# Patient Record
Sex: FEMALE | Race: WHITE | NOT HISPANIC OR LATINO | Employment: FULL TIME | URBAN - METROPOLITAN AREA
[De-identification: names, ages, dates, MRNs, and addresses within clinical notes are randomized per-mention and may not be internally consistent; named-entity substitution may affect disease eponyms.]

---

## 2018-12-13 ENCOUNTER — OFFICE VISIT (OUTPATIENT)
Dept: FAMILY MEDICINE CLINIC | Facility: CLINIC | Age: 38
End: 2018-12-13

## 2018-12-13 VITALS
HEIGHT: 63 IN | SYSTOLIC BLOOD PRESSURE: 118 MMHG | WEIGHT: 155 LBS | RESPIRATION RATE: 13 BRPM | BODY MASS INDEX: 27.46 KG/M2 | DIASTOLIC BLOOD PRESSURE: 86 MMHG | OXYGEN SATURATION: 99 % | HEART RATE: 75 BPM

## 2018-12-13 DIAGNOSIS — R53.82 CHRONIC FATIGUE: ICD-10-CM

## 2018-12-13 DIAGNOSIS — G43.109 MIGRAINE WITH AURA AND WITHOUT STATUS MIGRAINOSUS, NOT INTRACTABLE: ICD-10-CM

## 2018-12-13 DIAGNOSIS — Z13.220 SCREENING FOR HYPERLIPIDEMIA: ICD-10-CM

## 2018-12-13 DIAGNOSIS — Z23 NEED FOR VACCINATION: ICD-10-CM

## 2018-12-13 DIAGNOSIS — F32.A ANXIETY AND DEPRESSION: Primary | ICD-10-CM

## 2018-12-13 DIAGNOSIS — Z13.1 SCREENING FOR DIABETES MELLITUS: ICD-10-CM

## 2018-12-13 DIAGNOSIS — F41.9 ANXIETY AND DEPRESSION: Primary | ICD-10-CM

## 2018-12-13 DIAGNOSIS — R47.1 DYSARTHRIA: ICD-10-CM

## 2018-12-13 LAB
ALBUMIN SERPL-MCNC: 4.3 G/DL (ref 3.5–5.2)
ALBUMIN/GLOB SERPL: 1.6 G/DL
ALP SERPL-CCNC: 62 U/L (ref 39–117)
ALT SERPL-CCNC: 14 U/L (ref 1–33)
AST SERPL-CCNC: 17 U/L (ref 1–32)
BILIRUB SERPL-MCNC: 0.4 MG/DL (ref 0.1–1.2)
BUN SERPL-MCNC: 10 MG/DL (ref 6–20)
BUN/CREAT SERPL: 11.9 (ref 7–25)
CALCIUM SERPL-MCNC: 9.8 MG/DL (ref 8.6–10.5)
CHLORIDE SERPL-SCNC: 102 MMOL/L (ref 98–107)
CHOLEST SERPL-MCNC: 228 MG/DL (ref 0–200)
CO2 SERPL-SCNC: 25 MMOL/L (ref 22–29)
CREAT SERPL-MCNC: 0.84 MG/DL (ref 0.57–1)
ERYTHROCYTE [DISTWIDTH] IN BLOOD BY AUTOMATED COUNT: 13.8 % (ref 11.7–13)
GLOBULIN SER CALC-MCNC: 2.7 GM/DL
GLUCOSE SERPL-MCNC: 91 MG/DL (ref 65–99)
HBA1C MFR BLD: 5.3 % (ref 4.8–5.6)
HCT VFR BLD AUTO: 42.2 % (ref 35.6–45.5)
HDLC SERPL-MCNC: 62 MG/DL (ref 40–60)
HGB BLD-MCNC: 13.3 G/DL (ref 11.9–15.5)
LDLC SERPL CALC-MCNC: 145 MG/DL (ref 0–100)
MCH RBC QN AUTO: 28.3 PG (ref 26.9–32)
MCHC RBC AUTO-ENTMCNC: 31.5 G/DL (ref 32.4–36.3)
MCV RBC AUTO: 89.8 FL (ref 80.5–98.2)
PLATELET # BLD AUTO: 274 10*3/MM3 (ref 140–500)
POTASSIUM SERPL-SCNC: 4.4 MMOL/L (ref 3.5–5.2)
PROT SERPL-MCNC: 7 G/DL (ref 6–8.5)
RBC # BLD AUTO: 4.7 10*6/MM3 (ref 3.9–5.2)
SODIUM SERPL-SCNC: 140 MMOL/L (ref 136–145)
TRIGL SERPL-MCNC: 106 MG/DL (ref 0–150)
TSH SERPL DL<=0.005 MIU/L-ACNC: 2.86 MIU/ML (ref 0.27–4.2)
VLDLC SERPL CALC-MCNC: 21.2 MG/DL (ref 5–40)
WBC # BLD AUTO: 8.15 10*3/MM3 (ref 4.5–10.7)

## 2018-12-13 PROCEDURE — 99204 OFFICE O/P NEW MOD 45 MIN: CPT | Performed by: FAMILY MEDICINE

## 2018-12-13 PROCEDURE — 90674 CCIIV4 VAC NO PRSV 0.5 ML IM: CPT | Performed by: FAMILY MEDICINE

## 2018-12-13 PROCEDURE — 90471 IMMUNIZATION ADMIN: CPT | Performed by: FAMILY MEDICINE

## 2018-12-13 RX ORDER — VILAZODONE HYDROCHLORIDE 40 MG/1
40 TABLET ORAL DAILY
Qty: 90 TABLET | Refills: 1 | Status: SHIPPED | OUTPATIENT
Start: 2018-12-13 | End: 2019-09-18 | Stop reason: SDUPTHER

## 2018-12-13 RX ORDER — TRAZODONE HYDROCHLORIDE 100 MG/1
100 TABLET ORAL
Refills: 0 | COMMUNITY
Start: 2018-11-15 | End: 2019-09-18 | Stop reason: SDUPTHER

## 2018-12-13 RX ORDER — VILAZODONE HYDROCHLORIDE 40 MG/1
TABLET ORAL
Refills: 0 | COMMUNITY
Start: 2018-10-25 | End: 2018-12-13 | Stop reason: SDUPTHER

## 2018-12-13 RX ORDER — LORAZEPAM 1 MG/1
1 TABLET ORAL EVERY 8 HOURS PRN
COMMUNITY
End: 2018-12-16 | Stop reason: ALTCHOICE

## 2018-12-13 RX ORDER — ALBUTEROL SULFATE 90 UG/1
2 AEROSOL, METERED RESPIRATORY (INHALATION) EVERY 4 HOURS PRN
COMMUNITY

## 2018-12-13 RX ORDER — PROPRANOLOL HYDROCHLORIDE 20 MG/1
20 TABLET ORAL 3 TIMES DAILY PRN
Qty: 90 TABLET | Refills: 1 | Status: SHIPPED | OUTPATIENT
Start: 2018-12-13 | End: 2019-01-11 | Stop reason: SDUPTHER

## 2018-12-13 NOTE — PROGRESS NOTES
Subjective   Charis Herbert is a 38 y.o. female.     Chief Complaint   Patient presents with   • Anxiety   • Establish Care       HPI     Recently moved back to [place] from [place]    Anxiety and Depression:  -chronic condition  -no longer able to afford to follow up with her Psychiatrist  -but she does continue to follow up with a therapist monthly  -currently taking viibryd 40 mg daily and trazodone 75 mg QHS  -previously she took lorazepam 1 mg TID prn anxiety and panic attacks as well, but she ran out of this medication a couple of months ago  -she would be open to a trial of a different prn medication for panic attacks  -never hospitalized for mental health concerns  -NO suicidal thoughts or hx of self harm       She would like to get a flu shot today    ROS notable for intermittently garbled speech over the last month.  Episodes usually last a few minutes and consist of word finding difficulty as well as difficulty pronouncing words properly.  No associated numbness, weakness, or abnormal movements.  Last episode was about 1 month ago.  She does have a hx of migraines with visual aura, but these episodes are not concurrent with headaches.      Review of Systems   Constitutional: Positive for fatigue. Negative for fever.   HENT: Negative for congestion and sore throat.    Eyes: Negative for photophobia and pain.   Respiratory: Negative for cough and shortness of breath.    Cardiovascular: Negative for chest pain and palpitations.   Gastrointestinal: Negative for abdominal pain and nausea.   Musculoskeletal: Positive for arthralgias and back pain.   Neurological: Positive for speech difficulty and headaches. Negative for dizziness, weakness and numbness.   Psychiatric/Behavioral: Positive for sleep disturbance. Negative for self-injury and suicidal ideas. The patient is nervous/anxious.        The following portions of the patient's history were reviewed and updated as appropriate: allergies, current  medications, past family history, past medical history, past social history, past surgical history and problem list.    Past Medical History:   Diagnosis Date   • Allergic    • Anxiety    • Depression    • Hyperlipidemia    • Migraine with aura    • Sciatica      Past Surgical History:   Procedure Laterality Date   • FACIAL RECONSTRUCTION SURGERY      due to infection after root canal     Family History   Problem Relation Age of Onset   • Breast cancer Mother    • Depression Mother    • Miscarriages / Stillbirths Sister    • Alcohol abuse Brother    • Drug abuse Brother    • Breast cancer Maternal Aunt    • Breast cancer Maternal Grandmother    • Stroke Maternal Grandmother    • Alcohol abuse Maternal Grandmother    • Alcohol abuse Maternal Grandfather      Social History     Tobacco Use   • Smoking status: Current Some Day Smoker   • Smokeless tobacco: Never Used   Substance and Sexual Activity   • Alcohol use: Yes     Comment: intermittently   • Drug use: No   • Sexual activity: Yes                 Social History Narrative    She lives at home with her cat.  She works in childcare and education with toddlers.          Allergies   Allergen Reactions   • Dilaudid [Hydromorphone] Rash        Outpatient Medications Prior to Visit   Medication Sig Dispense Refill   • albuterol 108 (90 Base) MCG/ACT inhaler Inhale 2 puffs Every 4 (Four) Hours As Needed.            • traZODone (DESYREL) 100 MG tablet Take 75 mg by mouth every night at bedtime.  0   • VIIBRYD 40 MG tablet tablet TAKE 1 TABLET BY MOUTH EVERY MORNING WITH FOOD  0         Objective     Vitals:    12/13/18 0922   BP: 118/86   Pulse: 75   Resp: 13   SpO2: 99%       Physical Exam   Constitutional: She is oriented to person, place, and time. She appears well-developed and well-nourished. No distress.   HENT:   Head: Normocephalic and atraumatic.   Eyes: Conjunctivae and EOM are normal. Pupils are equal, round, and reactive to light.   Neck: No thyromegaly  present.   Cardiovascular: Normal rate, regular rhythm and normal heart sounds. Exam reveals no gallop and no friction rub.   No murmur heard.  Pulmonary/Chest: Effort normal and breath sounds normal. No respiratory distress. She has no wheezes. She has no rhonchi. She has no rales.   Abdominal: Soft. Bowel sounds are normal. She exhibits no distension. There is no tenderness.   Lymphadenopathy:     She has no cervical adenopathy.   Neurological: She is alert and oriented to person, place, and time. She has normal strength. No cranial nerve deficit. Gait normal.   Skin: Skin is warm and dry.   Psychiatric: Her speech is normal. Her mood appears anxious.       ASSESSMENT/PLAN       Problem List Items Addressed This Visit        Cardiovascular and Mediastinum    New onset episodic Dysarthria in a pt with a hx of Migraine ddx is broad and encompasses new migraine manifestation, mass lesion, TIA or stroke, demyelinating disorder, and medication side effects.  Pt is asymptomatic today and last experienced symptoms 1 month ago.      Other Relevant Orders   CT Head With Contrast       Trial of propranolol 20 mg TID prn anxiety may also reduce migraine frequency        Pt advised to go to ER if dysarthria symptoms return prior to follow up appt              Other Visit Diagnoses     Anxiety and depression    -  Primary    Refill Medications    VIIBRYD 40 MG tablet tablet  Continue Trazodone      Start propranolol (INDERAL) 20 MG tablet TID prn panic attack/severe anxiety    Other Relevant Orders    TSH (Completed)    Need for vaccination        Relevant Orders    Flucelvax Quad=>4Years (6950-3559) (Completed)    Screening for diabetes mellitus        Relevant Orders    Comprehensive metabolic panel (Completed)    Hemoglobin A1c (Completed)    Screening for hyperlipidemia        Relevant Orders    Lipid panel (Completed)    Chronic fatigue        Relevant Orders    Comprehensive metabolic panel (Completed)    Hemoglobin A1c  (Completed)    TSH (Completed)    CBC No Differential (Completed)       Patient Instructions   Migraine Headache  A migraine headache is an intense, throbbing pain on one side or both sides of the head. Migraines may also cause other symptoms, such as nausea, vomiting, and sensitivity to light and noise.  What are the causes?  Doing or taking certain things may also trigger migraines, such as:  · Alcohol.  · Smoking.  · Medicines, such as:  ? Medicine used to treat chest pain (nitroglycerine).  ? Birth control pills.  ? Estrogen pills.  ? Certain blood pressure medicines.  · Aged cheeses, chocolate, or caffeine.  · Foods or drinks that contain nitrates, glutamate, aspartame, or tyramine.  · Physical activity.    Other things that may trigger a migraine include:  · Menstruation.  · Pregnancy.  · Hunger.  · Stress, lack of sleep, too much sleep, or fatigue.  · Weather changes.    What increases the risk?  The following factors may make you more likely to experience migraine headaches:  · Age. Risk increases with age.  · Family history of migraine headaches.  · Being .  · Depression and anxiety.  · Obesity.  · Being a woman.  · Having a hole in the heart (patent foramen ovale) or other heart problems.    What are the signs or symptoms?  The main symptom of this condition is pulsating or throbbing pain. Pain may:  · Happen in any area of the head, such as on one side or both sides.  · Interfere with daily activities.  · Get worse with physical activity.  · Get worse with exposure to bright lights or loud noises.    Other symptoms may include:  · Nausea.  · Vomiting.  · Dizziness.  · General sensitivity to bright lights, loud noises, or smells.    Before you get a migraine, you may get warning signs that a migraine is developing (aura). An aura may include:  · Seeing flashing lights or having blind spots.  · Seeing bright spots, halos, or zigzag lines.  · Having tunnel vision or blurred vision.  · Having  numbness or a tingling feeling.  · Having trouble talking.  · Having muscle weakness.    How is this diagnosed?  A migraine headache can be diagnosed based on:  · Your symptoms.  · A physical exam.  · Tests, such as CT scan or MRI of the head. These imaging tests can help rule out other causes of headaches.  · Taking fluid from the spine (lumbar puncture) and analyzing it (cerebrospinal fluid analysis, or CSF analysis).    How is this treated?  A migraine headache is usually treated with medicines that:  · Relieve pain.  · Relieve nausea.  · Prevent migraines from coming back.    Treatment may also include:  · Acupuncture.  · Lifestyle changes like avoiding foods that trigger migraines.    Follow these instructions at home:  Medicines  · Take over-the-counter and prescription medicines only as told by your health care provider.  · Do not drive or use heavy machinery while taking prescription pain medicine.  · To prevent or treat constipation while you are taking prescription pain medicine, your health care provider may recommend that you:  ? Drink enough fluid to keep your urine clear or pale yellow.  ? Take over-the-counter or prescription medicines.  ? Eat foods that are high in fiber, such as fresh fruits and vegetables, whole grains, and beans.  ? Limit foods that are high in fat and processed sugars, such as fried and sweet foods.  Lifestyle  · Avoid alcohol use.  · Do not use any products that contain nicotine or tobacco, such as cigarettes and e-cigarettes. If you need help quitting, ask your health care provider.  · Get at least 8 hours of sleep every night.  · Limit your stress.  General instructions    · Keep a journal to find out what may trigger your migraine headaches. For example, write down:  ? What you eat and drink.  ? How much sleep you get.  ? Any change to your diet or medicines.  · If you have a migraine:  ? Avoid things that make your symptoms worse, such as bright lights.  ? It may help to lie  down in a dark, quiet room.  ? Do not drive or use heavy machinery.  ? Ask your health care provider what activities are safe for you while you are experiencing symptoms.  · Keep all follow-up visits as told by your health care provider. This is important.  Contact a health care provider if:  · You develop symptoms that are different or more severe than your usual migraine symptoms.  Get help right away if:  · Your migraine becomes severe.  · You have a fever.  · You have a stiff neck.  · You have vision loss.  · Your muscles feel weak or like you cannot control them.  · You start to lose your balance often.  · You develop trouble walking.  · You faint.  This information is not intended to replace advice given to you by your health care provider. Make sure you discuss any questions you have with your health care provider.  Document Released: 12/18/2006 Document Revised: 07/07/2017 Document Reviewed: 06/05/2017  [employer] Interactive Patient Education © 2017 [employer]      Return in about 1 month (around 1/13/2019) for Recheck mood and migraines.      Aishwarya Puckett MD  12/13/18

## 2018-12-18 ENCOUNTER — TELEPHONE (OUTPATIENT)
Dept: FAMILY MEDICINE CLINIC | Facility: CLINIC | Age: 38
End: 2018-12-18

## 2018-12-18 NOTE — TELEPHONE ENCOUNTER
Left VM to let patient know lab results and advised diet & exercise.     ----- Message from Aishwarya Puckett MD sent at 12/15/2018 11:14 AM EST -----  Please contact pt and let her know that her cholesterol is a little high, but not to the degree that she needs medication at this time.   Exercising regularly and eating a healthy diet rich in fresh produce, fiber, and lean protein but low in added sugar and fat can help manage this problem and reduce the risk for heart disease in the future.

## 2019-01-11 DIAGNOSIS — F41.9 ANXIETY AND DEPRESSION: ICD-10-CM

## 2019-01-11 DIAGNOSIS — F32.A ANXIETY AND DEPRESSION: ICD-10-CM

## 2019-01-11 RX ORDER — PROPRANOLOL HYDROCHLORIDE 20 MG/1
20 TABLET ORAL 3 TIMES DAILY PRN
Qty: 270 TABLET | Refills: 0 | Status: SHIPPED | OUTPATIENT
Start: 2019-01-11 | End: 2019-04-16 | Stop reason: SDUPTHER

## 2019-01-23 ENCOUNTER — OFFICE VISIT (OUTPATIENT)
Dept: FAMILY MEDICINE CLINIC | Facility: CLINIC | Age: 39
End: 2019-01-23

## 2019-01-23 VITALS
BODY MASS INDEX: 26.4 KG/M2 | DIASTOLIC BLOOD PRESSURE: 86 MMHG | HEART RATE: 78 BPM | OXYGEN SATURATION: 98 % | HEIGHT: 63 IN | WEIGHT: 149 LBS | SYSTOLIC BLOOD PRESSURE: 122 MMHG | RESPIRATION RATE: 13 BRPM

## 2019-01-23 DIAGNOSIS — F32.A DEPRESSION, UNSPECIFIED DEPRESSION TYPE: ICD-10-CM

## 2019-01-23 DIAGNOSIS — J40 BRONCHITIS: Primary | ICD-10-CM

## 2019-01-23 DIAGNOSIS — R06.83 SNORING: ICD-10-CM

## 2019-01-23 DIAGNOSIS — R53.82 CHRONIC FATIGUE: ICD-10-CM

## 2019-01-23 DIAGNOSIS — G43.809 OTHER MIGRAINE WITHOUT STATUS MIGRAINOSUS, NOT INTRACTABLE: ICD-10-CM

## 2019-01-23 PROCEDURE — 99214 OFFICE O/P EST MOD 30 MIN: CPT | Performed by: FAMILY MEDICINE

## 2019-01-23 RX ORDER — BENZONATATE 200 MG/1
200 CAPSULE ORAL 3 TIMES DAILY PRN
Qty: 30 CAPSULE | Refills: 0 | Status: SHIPPED | OUTPATIENT
Start: 2019-01-23 | End: 2019-02-02

## 2019-01-23 NOTE — PATIENT INSTRUCTIONS
Honey in hot tea can help soothe a sore throat.   AlternateTylenol and Ibuprofen every 4-6 hours as needed for pain and fever.  Get plenty of rest and fluids      Acute Bronchitis, Adult  Acute bronchitis is when air tubes (bronchi) in the lungs suddenly get swollen. The condition can make it hard to breathe. It can also cause these symptoms:  · A cough.  · Coughing up clear, yellow, or green mucus.  · Wheezing.  · Chest congestion.  · Shortness of breath.  · A fever.  · Body aches.  · Chills.  · A sore throat.    Follow these instructions at home:  Medicines  · Take over-the-counter and prescription medicines only as told by your doctor.  · If you were prescribed an antibiotic medicine, take it as told by your doctor. Do not stop taking the antibiotic even if you start to feel better.  General instructions  · Rest.  · Drink enough fluids to keep your pee (urine) clear or pale yellow.  · Avoid smoking and secondhand smoke. If you smoke and you need help quitting, ask your doctor. Quitting will help your lungs heal faster.  · Use an inhaler, cool mist vaporizer, or humidifier as told by your doctor.  · Keep all follow-up visits as told by your doctor. This is important.  How is this prevented?  To lower your risk of getting this condition again:  · Wash your hands often with soap and water. If you cannot use soap and water, use hand .  · Avoid contact with people who have cold symptoms.  · Try not to touch your hands to your mouth, nose, or eyes.  · Make sure to get the flu shot every year.    Contact a doctor if:  · Your symptoms do not get better in 2 weeks.  Get help right away if:  · You cough up blood.  · You have chest pain.  · You have very bad shortness of breath.  · You become dehydrated.  · You faint (pass out) or keep feeling like you are going to pass out.  · You keep throwing up (vomiting).  · You have a very bad headache.  · Your fever or chills gets worse.  This information is not intended to  replace advice given to you by your health care provider. Make sure you discuss any questions you have with your health care provider.  Document Released: 06/05/2009 Document Revised: 07/26/2017 Document Reviewed: 06/07/2017  [employer] Interactive Patient Education © 2018 [employer]

## 2019-01-23 NOTE — PROGRESS NOTES
Subjective   Charis Herbert is a 38 y.o. female.     Chief Complaint   Patient presents with   • Anxiety     Follow Up   • Migraine       HPI     Anxiety & Depression:  -she continues taking viibryd 40 mg daily and trazodone 75 mg QHS  -no panic attacks since last office visit  -she tried a dose of propranolol and tolerated it well with no adverse effects, so she plans to take it next time she has a panic attack  -mood stable and good over all  -no SI, HI, or self harm  -she is in a new relationship and things are going well  -she quit smoking 2 weeks ago along with new girlfriend     Cough:  -started about 2 weeks ago  -treated with antibiotics, steroid, cough syrup, and an inahler for bronchitis diagnosed in urgent care  -gradually improving    Migraines:  -hasn't had any migraines or even headaches since last visit  -no further episodes of garbled speech, so she canceled the CT scan of her head    ROS also notable for chronic fatigue.  She wakes up groggy every morning.  She does snore.  No PND but she does wake up unexpectedly 2-3 x per night.         Review of Systems   Constitutional: Positive for fatigue. Negative for fever.   HENT: Negative for congestion, ear pain and sore throat.    Respiratory: Positive for cough and wheezing. Negative for shortness of breath.    Cardiovascular: Negative for chest pain and palpitations.   Gastrointestinal: Negative for abdominal pain, diarrhea, nausea and vomiting.   Genitourinary: Negative for dysuria.   Musculoskeletal: Negative for arthralgias and myalgias.   Skin: Negative for rash.   Neurological: Negative for dizziness and headaches.   Psychiatric/Behavioral: Negative for dysphoric mood, self-injury and suicidal ideas. The patient is not nervous/anxious.        The following portions of the patient's history were reviewed and updated as appropriate: allergies, current medications, past family history, past medical history, past social history, past surgical history  and problem list.    Past Medical History:   Diagnosis Date   • Allergic    • Anxiety    • Depression    • Hyperlipidemia    • Migraine with aura    • Sciatica      Past Surgical History:   Procedure Laterality Date   • FACIAL RECONSTRUCTION SURGERY      due to infection after root canal     Family History   Problem Relation Age of Onset   • Breast cancer Mother    • Depression Mother    • Miscarriages / Stillbirths Sister    • Alcohol abuse Brother    • Drug abuse Brother    • Breast cancer Maternal Aunt    • Breast cancer Maternal Grandmother    • Stroke Maternal Grandmother    • Alcohol abuse Maternal Grandmother    • Alcohol abuse Maternal Grandfather      Social History     Tobacco Use   • Smoking status: Former Smoker     Last attempt to quit: 2019     Years since quittin.0   • Smokeless tobacco: Never Used   Substance and Sexual Activity   • Alcohol use: Yes     Comment: intermittently   • Drug use: No   • Sexual activity: Yes     Partners: Female   Other Topics Concern   • Not on file   Social History Narrative    She lives at home with her cat.  She works in childcare and education with toddlers.          Allergies   Allergen Reactions   • Dilaudid [Hydromorphone] Rash        Outpatient Medications Prior to Visit   Medication Sig Dispense Refill   • albuterol 108 (90 Base) MCG/ACT inhaler Inhale 2 puffs Every 4 (Four) Hours As Needed.     • propranolol (INDERAL) 20 MG tablet Take 1 tablet by mouth 3 (Three) Times a Day As Needed (anxiety). 270 tablet 0   • traZODone (DESYREL) 100 MG tablet Take 75 mg by mouth every night at bedtime.  0   • VIIBRYD 40 MG tablet tablet Take 1 tablet by mouth Daily. 90 tablet 1     No facility-administered medications prior to visit.        Objective     Vitals:    19 1015   BP: 122/86   Pulse: 78   Resp: 13   SpO2: 98%       Physical Exam   Constitutional: She appears well-developed and well-nourished. No distress.   HENT:   Head: Normocephalic and atraumatic.    Right Ear: Tympanic membrane and ear canal normal.   Left Ear: Tympanic membrane and ear canal normal.   Mouth/Throat: Uvula is midline, oropharynx is clear and moist and mucous membranes are normal.   Neck: No thyromegaly present.   Cardiovascular: Normal rate, regular rhythm and normal heart sounds. Exam reveals no gallop and no friction rub.   No murmur heard.  Pulmonary/Chest: Effort normal and breath sounds normal. No respiratory distress. She has no wheezes. She has no rhonchi. She has no rales.   Abdominal: Soft. Bowel sounds are normal. She exhibits no distension. There is no tenderness.   Lymphadenopathy:     She has no cervical adenopathy.   Skin: Skin is warm and dry.   Psychiatric: She has a normal mood and affect. Her behavior is normal.       ASSESSMENT/PLAN       Problem List Items Addressed This Visit        Cardiovascular and Mediastinum    Migraine  Spontaneously improving  Return to office if headaches return       Other    Depression and anxiety  well controlled, continue current medication regimen      Other Visit Diagnoses     Bronchitis    -  Primary, improving, normal physical exam today    Relevant Medications    benzonatate (TESSALON) 200 MG capsule    Chronic fatigue        Relevant Orders    Ambulatory Referral to Sleep Medicine    Snoring        Relevant Orders    Ambulatory Referral to Sleep Medicine            Patient Instructions   Honey in hot tea can help soothe a sore throat.   AlternateTylenol and Ibuprofen every 4-6 hours as needed for pain and fever.  Get plenty of rest and fluids      Acute Bronchitis, Adult  Acute bronchitis is when air tubes (bronchi) in the lungs suddenly get swollen. The condition can make it hard to breathe. It can also cause these symptoms:  · A cough.  · Coughing up clear, yellow, or green mucus.  · Wheezing.  · Chest congestion.  · Shortness of breath.  · A fever.  · Body aches.  · Chills.  · A sore throat.    Follow these instructions at  home:  Medicines  · Take over-the-counter and prescription medicines only as told by your doctor.  · If you were prescribed an antibiotic medicine, take it as told by your doctor. Do not stop taking the antibiotic even if you start to feel better.  General instructions  · Rest.  · Drink enough fluids to keep your pee (urine) clear or pale yellow.  · Avoid smoking and secondhand smoke. If you smoke and you need help quitting, ask your doctor. Quitting will help your lungs heal faster.  · Use an inhaler, cool mist vaporizer, or humidifier as told by your doctor.  · Keep all follow-up visits as told by your doctor. This is important.  How is this prevented?  To lower your risk of getting this condition again:  · Wash your hands often with soap and water. If you cannot use soap and water, use hand .  · Avoid contact with people who have cold symptoms.  · Try not to touch your hands to your mouth, nose, or eyes.  · Make sure to get the flu shot every year.    Contact a doctor if:  · Your symptoms do not get better in 2 weeks.  Get help right away if:  · You cough up blood.  · You have chest pain.  · You have very bad shortness of breath.  · You become dehydrated.  · You faint (pass out) or keep feeling like you are going to pass out.  · You keep throwing up (vomiting).  · You have a very bad headache.  · Your fever or chills gets worse.  This information is not intended to replace advice given to you by your health care provider. Make sure you discuss any questions you have with your health care provider.  Document Released: 06/05/2009 Document Revised: 07/26/2017 Document Reviewed: 06/07/2017  [employer] Interactive Patient Education © 2018 [employer]      Return in about 6 months (around 7/23/2019) for Annual with pap smear.      Aishwarya Puckett MD  01/23/19

## 2019-03-01 ENCOUNTER — APPOINTMENT (OUTPATIENT)
Dept: SLEEP MEDICINE | Facility: HOSPITAL | Age: 39
End: 2019-03-01
Attending: INTERNAL MEDICINE

## 2019-04-16 DIAGNOSIS — F41.9 ANXIETY AND DEPRESSION: ICD-10-CM

## 2019-04-16 DIAGNOSIS — F32.A ANXIETY AND DEPRESSION: ICD-10-CM

## 2019-04-16 RX ORDER — PROPRANOLOL HYDROCHLORIDE 20 MG/1
20 TABLET ORAL 3 TIMES DAILY PRN
Qty: 270 TABLET | Refills: 1 | Status: SHIPPED | OUTPATIENT
Start: 2019-04-16 | End: 2019-11-20 | Stop reason: SDUPTHER

## 2019-06-13 ENCOUNTER — OFFICE VISIT (OUTPATIENT)
Dept: INTERNAL MEDICINE | Age: 39
End: 2019-06-13

## 2019-06-13 ENCOUNTER — HOSPITAL ENCOUNTER (OUTPATIENT)
Dept: GENERAL RADIOLOGY | Facility: HOSPITAL | Age: 39
Discharge: HOME | End: 2019-06-13
Admitting: NURSE PRACTITIONER

## 2019-06-13 VITALS
HEART RATE: 83 BPM | OXYGEN SATURATION: 99 % | SYSTOLIC BLOOD PRESSURE: 108 MMHG | WEIGHT: 152.2 LBS | BODY MASS INDEX: 26.97 KG/M2 | DIASTOLIC BLOOD PRESSURE: 82 MMHG | HEIGHT: 63 IN | TEMPERATURE: 97.3 F

## 2019-06-13 DIAGNOSIS — Z80.3 FAMILY HISTORY OF BREAST CANCER: ICD-10-CM

## 2019-06-13 DIAGNOSIS — J45.901 ALLERGIC BRONCHITIS WITH ACUTE EXACERBATION: ICD-10-CM

## 2019-06-13 DIAGNOSIS — G43.809 OTHER MIGRAINE WITHOUT STATUS MIGRAINOSUS, NOT INTRACTABLE: ICD-10-CM

## 2019-06-13 DIAGNOSIS — J20.9 ACUTE BRONCHITIS, UNSPECIFIED ORGANISM: Primary | ICD-10-CM

## 2019-06-13 DIAGNOSIS — R05.8 RECURRENT COUGH: ICD-10-CM

## 2019-06-13 DIAGNOSIS — Z76.89 ENCOUNTER TO ESTABLISH CARE: ICD-10-CM

## 2019-06-13 PROCEDURE — 71046 X-RAY EXAM CHEST 2 VIEWS: CPT

## 2019-06-13 PROCEDURE — 99214 OFFICE O/P EST MOD 30 MIN: CPT | Performed by: NURSE PRACTITIONER

## 2019-06-13 RX ORDER — METHYLPREDNISOLONE 4 MG/1
TABLET ORAL
Qty: 1 EACH | Refills: 0 | Status: SHIPPED | OUTPATIENT
Start: 2019-06-13 | End: 2020-06-04

## 2019-06-13 RX ORDER — FEXOFENADINE HCL 180 MG/1
180 TABLET ORAL DAILY
COMMUNITY
Start: 2019-06-13

## 2019-06-14 ENCOUNTER — TELEPHONE (OUTPATIENT)
Dept: OBSTETRICS AND GYNECOLOGY | Age: 39
End: 2019-06-14

## 2019-06-14 NOTE — TELEPHONE ENCOUNTER
Referral received for NGYN pt needing to est care for an annual exam w family history of breast cancer.    Referred by:  Rea Cordoba    Sending to [place] for Dr Peace to review. (Aware Dr Peace is in [place] today)

## 2019-09-18 ENCOUNTER — OFFICE VISIT (OUTPATIENT)
Dept: INTERNAL MEDICINE | Age: 39
End: 2019-09-18

## 2019-09-18 VITALS
WEIGHT: 151.2 LBS | SYSTOLIC BLOOD PRESSURE: 110 MMHG | HEIGHT: 63 IN | DIASTOLIC BLOOD PRESSURE: 68 MMHG | HEART RATE: 84 BPM | OXYGEN SATURATION: 98 % | TEMPERATURE: 98.1 F | RESPIRATION RATE: 13 BRPM | BODY MASS INDEX: 26.79 KG/M2

## 2019-09-18 DIAGNOSIS — F32.A ANXIETY AND DEPRESSION: ICD-10-CM

## 2019-09-18 DIAGNOSIS — F41.9 ANXIETY AND DEPRESSION: ICD-10-CM

## 2019-09-18 DIAGNOSIS — F32.A DEPRESSION, UNSPECIFIED DEPRESSION TYPE: Primary | ICD-10-CM

## 2019-09-18 PROCEDURE — 99213 OFFICE O/P EST LOW 20 MIN: CPT | Performed by: INTERNAL MEDICINE

## 2019-09-18 RX ORDER — VILAZODONE HYDROCHLORIDE 40 MG/1
40 TABLET ORAL DAILY
Qty: 90 TABLET | Refills: 0 | Status: SHIPPED | OUTPATIENT
Start: 2019-09-18 | End: 2019-11-14 | Stop reason: SDUPTHER

## 2019-09-18 RX ORDER — TRAZODONE HYDROCHLORIDE 100 MG/1
100 TABLET ORAL
Qty: 90 TABLET | Refills: 0 | Status: SHIPPED | OUTPATIENT
Start: 2019-09-18 | End: 2020-06-04

## 2019-09-18 NOTE — PROGRESS NOTES
"  Charis Herbert is a 39 y.o. female who presents with   Chief Complaint   Patient presents with   • Depression     Needs refills-trazodone/Viibryd   .    39-year-old female.  Patient of nurse practitioner Adalid.  Previously was seen by Dr. Puckett who apparently has left town according to the patient.  Also had been under psychiatric care for \"depression\" but says she could not afford it and nurse practitioner Adalid has been providing her care and refilling her prescriptions.  She says both medications \"worked better for me than anything else I have ever taken\".      Depression   Visit Type: follow-up (Patient is doing well on current treatment as noted.  No symptoms or problems on today's visit.)         The following portions of the patient's history were reviewed and updated as appropriate: allergies, current medications and problem list.    Review of Systems   Constitutional: Negative.    Respiratory: Negative.    Cardiovascular: Negative.    Neurological: Negative.    Psychiatric/Behavioral: Negative.        Objective   Physical Exam   Constitutional: She is oriented to person, place, and time. She appears well-developed and well-nourished.   HENT:   Head: Normocephalic and atraumatic.   Eyes: EOM are normal. Pupils are equal, round, and reactive to light.   Cardiovascular: Normal rate, regular rhythm and normal heart sounds.   Pulmonary/Chest: Effort normal.   Neurological: She is alert and oriented to person, place, and time.   Psychiatric: She has a normal mood and affect. Her behavior is normal. Judgment and thought content normal.   Nursing note and vitals reviewed.      Assessment/Plan   Charis was seen today for depression.    Diagnoses and all orders for this visit:    Depression, unspecified depression type      Plan: Refill Viibryd for 3 months as requested; refill trazodone for 3 months as requested.  Patient advised that nurse practitioner Adalid will resume care after October when she returns " from personal time off.

## 2019-11-14 DIAGNOSIS — F32.A ANXIETY AND DEPRESSION: ICD-10-CM

## 2019-11-14 DIAGNOSIS — F41.9 ANXIETY AND DEPRESSION: ICD-10-CM

## 2019-11-15 RX ORDER — VILAZODONE HYDROCHLORIDE 40 MG/1
TABLET ORAL
Qty: 90 TABLET | Refills: 1 | Status: SHIPPED | OUTPATIENT
Start: 2019-11-15 | End: 2019-11-20 | Stop reason: SDUPTHER

## 2019-11-20 ENCOUNTER — OFFICE VISIT (OUTPATIENT)
Dept: INTERNAL MEDICINE | Age: 39
End: 2019-11-20

## 2019-11-20 VITALS
HEIGHT: 63 IN | TEMPERATURE: 98.6 F | WEIGHT: 153.4 LBS | HEART RATE: 74 BPM | SYSTOLIC BLOOD PRESSURE: 120 MMHG | RESPIRATION RATE: 13 BRPM | BODY MASS INDEX: 27.18 KG/M2 | DIASTOLIC BLOOD PRESSURE: 60 MMHG | OXYGEN SATURATION: 99 %

## 2019-11-20 DIAGNOSIS — Z23 NEED FOR INFLUENZA VACCINATION: ICD-10-CM

## 2019-11-20 DIAGNOSIS — F32.A ANXIETY AND DEPRESSION: ICD-10-CM

## 2019-11-20 DIAGNOSIS — F41.9 ANXIETY AND DEPRESSION: ICD-10-CM

## 2019-11-20 DIAGNOSIS — E78.2 MIXED HYPERLIPIDEMIA: Primary | ICD-10-CM

## 2019-11-20 LAB
ALBUMIN SERPL-MCNC: 4.5 G/DL (ref 3.5–5.2)
ALBUMIN/GLOB SERPL: 1.8 G/DL
ALP SERPL-CCNC: 67 U/L (ref 39–117)
ALT SERPL-CCNC: 22 U/L (ref 1–33)
AST SERPL-CCNC: 21 U/L (ref 1–32)
BILIRUB SERPL-MCNC: 0.2 MG/DL (ref 0.2–1.2)
BUN SERPL-MCNC: 6 MG/DL (ref 6–20)
BUN/CREAT SERPL: 7.6 (ref 7–25)
CALCIUM SERPL-MCNC: 9.5 MG/DL (ref 8.6–10.5)
CHLORIDE SERPL-SCNC: 103 MMOL/L (ref 98–107)
CHOLEST SERPL-MCNC: 210 MG/DL (ref 0–200)
CO2 SERPL-SCNC: 26 MMOL/L (ref 22–29)
CREAT SERPL-MCNC: 0.79 MG/DL (ref 0.57–1)
GLOBULIN SER CALC-MCNC: 2.5 GM/DL
GLUCOSE SERPL-MCNC: 92 MG/DL (ref 65–99)
HDLC SERPL-MCNC: 54 MG/DL (ref 40–60)
LDLC SERPL CALC-MCNC: 120 MG/DL (ref 0–100)
POTASSIUM SERPL-SCNC: 4.5 MMOL/L (ref 3.5–5.2)
PROT SERPL-MCNC: 7 G/DL (ref 6–8.5)
SODIUM SERPL-SCNC: 140 MMOL/L (ref 136–145)
T4 FREE SERPL-MCNC: 1.03 NG/DL (ref 0.93–1.7)
TRIGL SERPL-MCNC: 179 MG/DL (ref 0–150)
TSH SERPL DL<=0.005 MIU/L-ACNC: 2.5 UIU/ML (ref 0.27–4.2)
VLDLC SERPL CALC-MCNC: 35.8 MG/DL

## 2019-11-20 PROCEDURE — 90471 IMMUNIZATION ADMIN: CPT | Performed by: INTERNAL MEDICINE

## 2019-11-20 PROCEDURE — 99214 OFFICE O/P EST MOD 30 MIN: CPT | Performed by: INTERNAL MEDICINE

## 2019-11-20 PROCEDURE — 90674 CCIIV4 VAC NO PRSV 0.5 ML IM: CPT | Performed by: INTERNAL MEDICINE

## 2019-11-20 RX ORDER — PROPRANOLOL HYDROCHLORIDE 20 MG/1
20 TABLET ORAL 3 TIMES DAILY PRN
Qty: 270 TABLET | Refills: 1 | Status: SHIPPED | OUTPATIENT
Start: 2019-11-20 | End: 2020-06-04 | Stop reason: SDUPTHER

## 2019-11-20 RX ORDER — VILAZODONE HYDROCHLORIDE 40 MG/1
40 TABLET ORAL DAILY
Qty: 90 TABLET | Refills: 1 | Status: SHIPPED | OUTPATIENT
Start: 2019-11-20 | End: 2020-06-01

## 2019-11-20 NOTE — PROGRESS NOTES
"  Charis Herbert is a 39 y.o. female who presents with   Chief Complaint   Patient presents with   • Hyperlipidemia     Elevations of cholesterol, HDL and LDL.  No current treatment.   • Requests flu shot   • Prescription refills     Propranolol/Viibryd   .    39-year-old female presents for \"prescription refills\".  I am not sure whether she considers me as her primary care physician or whether she considers nurse practitioner Adalid as her primary care provider but in any event I am seeing her today in nurse practitioner Adalid's absence      Hyperlipidemia   This is a chronic problem. The current episode started more than 1 year ago. Recent lipid tests were reviewed and are high. She is currently on no antihyperlipidemic treatment.        /60   Pulse 74   Temp 98.6 °F (37 °C)   Resp 13   Ht 160 cm (62.99\")   Wt 69.6 kg (153 lb 6.4 oz)   LMP 11/09/2019 (Exact Date)   SpO2 99%   Breastfeeding? No   BMI 27.18 kg/m²     The following portions of the patient's history were reviewed and updated as appropriate: allergies, current medications, past medical history and problem list.    Review of Systems   Constitutional: Negative.    HENT: Negative.    Eyes: Negative.    Respiratory: Negative.    Cardiovascular: Negative.    Genitourinary: Negative.    Musculoskeletal: Negative.    Skin: Negative.    Neurological: Negative.    Psychiatric/Behavioral: Negative.        Objective   Physical Exam   Constitutional: She is oriented to person, place, and time. She appears well-developed and well-nourished. No distress.   HENT:   Head: Normocephalic and atraumatic.   Eyes: Conjunctivae and EOM are normal. Pupils are equal, round, and reactive to light.   Neck: Normal range of motion. Neck supple. No thyromegaly present.   Neck exam negative.  Carotid auscultation normal-no bruits heard.   Cardiovascular: Normal rate, regular rhythm, normal heart sounds and intact distal pulses. Exam reveals no gallop and no friction " rub.   No murmur heard.  Pulmonary/Chest: Effort normal and breath sounds normal. No respiratory distress. She has no wheezes. She has no rales. She exhibits no tenderness.   Neurological: She is alert and oriented to person, place, and time.   Psychiatric: She has a normal mood and affect. Her behavior is normal. Judgment and thought content normal.   Nursing note and vitals reviewed.      Assessment/Plan   Charis was seen today for hyperlipidemia, requests flu shot and prescription refills.    Diagnoses and all orders for this visit:    Mixed hyperlipidemia  -     Comprehensive Metabolic Panel  -     Lipid Panel  -     TSH+Free T4    Need for influenza vaccination  -     Flucelvax Quad=>4Years ([id number])    Anxiety and depression      Plan: Labs as above.  Flu shot per request.  Refill Viibryd and propranolol per request.  Follow-up in 1 year assuming today's labs are acceptable

## 2019-12-05 DIAGNOSIS — Z00.00 PREVENTATIVE HEALTH CARE: Primary | ICD-10-CM

## 2019-12-10 ENCOUNTER — RESULTS ENCOUNTER (OUTPATIENT)
Dept: INTERNAL MEDICINE | Age: 39
End: 2019-12-10

## 2019-12-10 DIAGNOSIS — Z00.00 PREVENTATIVE HEALTH CARE: ICD-10-CM

## 2020-05-31 DIAGNOSIS — F32.A ANXIETY AND DEPRESSION: ICD-10-CM

## 2020-05-31 DIAGNOSIS — F41.9 ANXIETY AND DEPRESSION: ICD-10-CM

## 2020-06-01 RX ORDER — VILAZODONE HYDROCHLORIDE 40 MG/1
TABLET ORAL
Qty: 30 TABLET | Refills: 0 | Status: SHIPPED | OUTPATIENT
Start: 2020-06-01 | End: 2020-06-04 | Stop reason: SDUPTHER

## 2020-06-04 ENCOUNTER — TELEMEDICINE (OUTPATIENT)
Dept: INTERNAL MEDICINE | Age: 40
End: 2020-06-04

## 2020-06-04 DIAGNOSIS — F32.A ANXIETY AND DEPRESSION: Primary | ICD-10-CM

## 2020-06-04 DIAGNOSIS — G47.00 INSOMNIA, UNSPECIFIED TYPE: ICD-10-CM

## 2020-06-04 DIAGNOSIS — F41.9 ANXIETY AND DEPRESSION: Primary | ICD-10-CM

## 2020-06-04 PROCEDURE — 99214 OFFICE O/P EST MOD 30 MIN: CPT | Performed by: NURSE PRACTITIONER

## 2020-06-04 RX ORDER — PROPRANOLOL HYDROCHLORIDE 20 MG/1
20-40 TABLET ORAL 3 TIMES DAILY PRN
Qty: 270 TABLET | Refills: 1 | Status: SHIPPED | OUTPATIENT
Start: 2020-06-04 | End: 2021-02-02

## 2020-06-04 RX ORDER — TRAZODONE HYDROCHLORIDE 100 MG/1
50-100 TABLET ORAL
Qty: 90 TABLET | Refills: 0 | Status: SHIPPED | OUTPATIENT
Start: 2020-06-04 | End: 2020-09-29 | Stop reason: SDUPTHER

## 2020-06-04 RX ORDER — VILAZODONE HYDROCHLORIDE 40 MG/1
40 TABLET ORAL DAILY
Qty: 90 TABLET | Refills: 1 | Status: SHIPPED | OUTPATIENT
Start: 2020-06-04 | End: 2020-09-29 | Stop reason: SDUPTHER

## 2020-06-04 NOTE — PROGRESS NOTES
[facility]  Rea Herbert / 40 y.o. / female  06/04/2020      ASSESSMENT & PLAN:    Problem List Items Addressed This Visit     None      Visit Diagnoses     Anxiety and depression    -  Primary    Relevant Medications    propranolol (INDERAL) 20 MG tablet    traZODone (DESYREL) 100 MG tablet    VIIBRYD 40 MG tablet tablet    Other Relevant Orders    Ambulatory Referral to Psychiatry    Insomnia, unspecified type        Relevant Medications    traZODone (DESYREL) 100 MG tablet    Other Relevant Orders    Ambulatory Referral to Psychiatry        Orders Placed This Encounter   Procedures   • Ambulatory Referral to Psychiatry     New Medications Ordered This Visit   Medications   • propranolol (INDERAL) 20 MG tablet     Sig: Take 1-2 tablets by mouth 3 (Three) Times a Day As Needed (anxiety).     Dispense:  270 tablet     Refill:  1   • traZODone (DESYREL) 100 MG tablet     Sig: Take 0.5-1 tablets by mouth every night at bedtime.     Dispense:  90 tablet     Refill:  0   • VIIBRYD 40 MG tablet tablet     Sig: Take 1 tablet by mouth Daily.     Dispense:  90 tablet     Refill:  1       Summary/Discussion:    This was an audio and video enabled telemedicine encounter.    · I did spend 16 minutes in face-to-face interaction with patient, greater than 50% spent in counseling.      1. Anxiety and depression  Patient has been on Viibryd for some time now, but is suboptimally controlled as she is still having to take propanolol on a daily basis to control her anxiety symptoms.   She is tried multiple other SSRIs in the past and none have been effective.  I discussed with patient that I would recommend specialty management of her medications and optimization of treatment for insomnia as well. As long as her heart rate tolerates beta blocker and she is asymptomatic, can continue to take up to 40mg BID PRN for symptoms until seen by psychiatry. Patient is agreeable to plan of care.     -  Ambulatory Referral to Psychiatry  - propranolol (INDERAL) 20 MG tablet; Take 1-2 tablets by mouth 3 (Three) Times a Day As Needed (anxiety).  Dispense: 270 tablet; Refill: 1  - VIIBRYD 40 MG tablet tablet; Take 1 tablet by mouth Daily.  Dispense: 90 tablet; Refill: 1    2. Insomnia, unspecified type    - Ambulatory Referral to Psychiatry  - traZODone (DESYREL) 100 MG tablet; Take 0.5-1 tablets by mouth every night at bedtime.  Dispense: 90 tablet; Refill: 0        No follow-ups on file.    ____________________________________________________________________    MEDICATIONS  Current Outpatient Medications   Medication Sig Dispense Refill   • albuterol 108 (90 Base) MCG/ACT inhaler Inhale 2 puffs Every 4 (Four) Hours As Needed.     • propranolol (INDERAL) 20 MG tablet Take 1-2 tablets by mouth 3 (Three) Times a Day As Needed (anxiety). 270 tablet 1   • traZODone (DESYREL) 100 MG tablet Take 0.5-1 tablets by mouth every night at bedtime. 90 tablet 0   • VIIBRYD 40 MG tablet tablet Take 1 tablet by mouth Daily. 90 tablet 1   • fexofenadine (ALLEGRA ALLERGY) 180 MG tablet Take 1 tablet by mouth Daily.       No current facility-administered medications for this visit.           VITALS:    There were no vitals taken for this visit.    BP Readings from Last 3 Encounters:   11/20/19 120/60   09/18/19 110/68   06/13/19 108/82     Wt Readings from Last 3 Encounters:   11/20/19 69.6 kg (153 lb 6.4 oz)   09/18/19 68.6 kg (151 lb 3.2 oz)   06/13/19 69 kg (152 lb 3.2 oz)      There is no height or weight on file to calculate BMI.    CC:  Main reason(s) for today's visit: Anxiety and Insomnia      HPI:     Anxiety: Patient complains of anxiety disorder.  She has the following symptoms: insomnia. Onset of symptoms was approximately several years ago, stable since that time. She denies current suicidal and homicidal ideation. She has been stable on current Viibryd for many years. Had tried multiple other SSRIs in the past without  good relief of symptoms.  However, she reports she is having to take propanolol on an every day twice daily basis to help control her symptoms.    She does take trazodone 100 mg for sleep at nighttime, but reports it does increase her anxiety and she is subsequently having to take 40 mg of propanolol with this.  Heart rate has been approximately 50s to 60s, denies any dizziness or lightheadedness. She has also tried melatonin in the past but reports it did not work and makes her too groggy.       Patient Care Team:  Rea Cordoba APRN as PCP - General (Internal Medicine)    ____________________________________________________________________    REVIEW OF SYSTEMS    Review of Systems   Neurological: Negative for dizziness and light-headedness.   Psychiatric/Behavioral: Positive for sleep disturbance. Negative for dysphoric mood, self-injury and suicidal ideas. The patient is nervous/anxious.          PHYSICAL EXAMINATION    Physical Exam   Constitutional: She is oriented to person, place, and time. She appears well-developed and well-nourished. She is cooperative.   Neurological: She is alert and oriented to person, place, and time. She is not disoriented.   Psychiatric: She has a normal mood and affect. Her speech is normal and behavior is normal. Judgment and thought content normal. She is not actively hallucinating. Cognition and memory are normal. She is attentive.       REVIEWED DATA:    Labs:     Lab Results   Component Value Date     11/20/2019    K 4.5 11/20/2019    AST 21 11/20/2019    ALT 22 11/20/2019    BUN 6 11/20/2019    CREATININE 0.79 11/20/2019    CREATININE 0.84 12/13/2018    EGFRIFNONA 81 11/20/2019    EGFRIFAFRI 99 11/20/2019       Lab Results   Component Value Date    HGBA1C 5.30 12/13/2018       Lab Results   Component Value Date     (H) 11/20/2019     (H) 12/13/2018    HDL 54 11/20/2019    HDL 62 (H) 12/13/2018    TRIG 179 (H) 11/20/2019    TRIG 106 12/13/2018       Lab  Results   Component Value Date    TSH 2.500 2019    FREET4 1.03 2019       Lab Results   Component Value Date    WBC 8.15 2018    HGB 13.3 2018     2018       No results found for: PROTEIN, GLUCOSEU, BLOODU, NITRITEU, LEUKOCYTESUR    Imaging:         Medical Tests:         Summary of old records / correspondence / consultant report:         Request outside records:         ALLERGIES  Allergies   Allergen Reactions   • Dilaudid [Hydromorphone] Rash        PFSH:     The following portions of the patient's history were reviewed and updated as appropriate: Allergies / Current Medications / Past Medical History / Surgical History / Social History / Family History    PROBLEM LIST   Patient Active Problem List   Diagnosis   • Depression   • Anxiety   • Allergic   • Migraine   • Sciatica   • Hyperlipidemia       PAST MEDICAL HISTORY  Past Medical History:   Diagnosis Date   • Allergic    • Anxiety    • Depression    • Hyperlipidemia    • Migraine with aura    • Sciatica        SURGICAL HISTORY  Past Surgical History:   Procedure Laterality Date   • FACIAL RECONSTRUCTION SURGERY      due to infection after root canal       SOCIAL HISTORY  Social History     Socioeconomic History   • Marital status: Single     Spouse name: Not on file   • Number of children: Not on file   • Years of education: Not on file   • Highest education level: Not on file   Occupational History   • Occupation: childcare worker    Tobacco Use   • Smoking status: Former Smoker     Last attempt to quit: 2019     Years since quittin.4   • Smokeless tobacco: Never Used   Substance and Sexual Activity   • Alcohol use: Yes     Comment: intermittently   • Drug use: No   • Sexual activity: Yes     Partners: Female   Social History Narrative    She lives at home with her cat.  She works in childcare and education with toddlers.      Patient wants to document in chart that she is homosexual.        FAMILY  HISTORY  Family History   Problem Relation Age of Onset   • Breast cancer Mother    • Depression Mother    • Miscarriages / Stillbirths Sister    • Alcohol abuse Brother    • Drug abuse Brother    • Breast cancer Maternal Aunt    • Breast cancer Maternal Grandmother    • Stroke Maternal Grandmother    • Alcohol abuse Maternal Grandmother    • Alcohol abuse Maternal Grandfather    • No Known Problems Father          **Diana Disclaimer:   Much of this encounter note is an electronic transcription/translation of spoken language to printed text. The electronic translation of spoken language may permit erroneous, or at times, nonsensical words or phrases to be inadvertently transcribed. Although I have reviewed the note for such errors, some may still exist.

## 2020-09-29 DIAGNOSIS — F41.9 ANXIETY AND DEPRESSION: ICD-10-CM

## 2020-09-29 DIAGNOSIS — F32.A ANXIETY AND DEPRESSION: ICD-10-CM

## 2020-09-29 DIAGNOSIS — G47.00 INSOMNIA, UNSPECIFIED TYPE: ICD-10-CM

## 2020-09-30 RX ORDER — VILAZODONE HYDROCHLORIDE 40 MG/1
40 TABLET ORAL DAILY
Qty: 90 TABLET | Refills: 1 | Status: SHIPPED | OUTPATIENT
Start: 2020-09-30 | End: 2020-11-23

## 2020-09-30 RX ORDER — TRAZODONE HYDROCHLORIDE 100 MG/1
50-100 TABLET ORAL
Qty: 90 TABLET | Refills: 0 | Status: SHIPPED | OUTPATIENT
Start: 2020-09-30 | End: 2021-01-02 | Stop reason: SDUPTHER

## 2020-11-20 DIAGNOSIS — F41.9 ANXIETY AND DEPRESSION: ICD-10-CM

## 2020-11-20 DIAGNOSIS — F32.A ANXIETY AND DEPRESSION: ICD-10-CM

## 2020-11-23 RX ORDER — VILAZODONE HYDROCHLORIDE 40 MG/1
TABLET ORAL
Qty: 30 TABLET | Refills: 0 | Status: SHIPPED | OUTPATIENT
Start: 2020-11-23 | End: 2021-02-03 | Stop reason: SDUPTHER

## 2020-11-23 NOTE — TELEPHONE ENCOUNTER
Is patient seeing psychiatry?     If not, will need to be seen in next 1-2 months (last visit June 2020).

## 2021-01-02 DIAGNOSIS — G47.00 INSOMNIA, UNSPECIFIED TYPE: ICD-10-CM

## 2021-01-04 RX ORDER — TRAZODONE HYDROCHLORIDE 100 MG/1
50-100 TABLET ORAL
Qty: 30 TABLET | Refills: 1 | Status: SHIPPED | OUTPATIENT
Start: 2021-01-04 | End: 2021-02-01

## 2021-01-22 ENCOUNTER — TELEPHONE (OUTPATIENT)
Dept: INTERNAL MEDICINE | Age: 41
End: 2021-01-22

## 2021-02-01 ENCOUNTER — TELEMEDICINE (OUTPATIENT)
Dept: INTERNAL MEDICINE | Age: 41
End: 2021-02-01

## 2021-02-01 DIAGNOSIS — F32.A DEPRESSION, UNSPECIFIED DEPRESSION TYPE: ICD-10-CM

## 2021-02-01 DIAGNOSIS — G47.00 INSOMNIA, UNSPECIFIED TYPE: ICD-10-CM

## 2021-02-01 DIAGNOSIS — F41.9 ANXIETY: Primary | ICD-10-CM

## 2021-02-01 PROCEDURE — 99214 OFFICE O/P EST MOD 30 MIN: CPT | Performed by: NURSE PRACTITIONER

## 2021-02-01 RX ORDER — HYDROXYZINE PAMOATE 50 MG/1
50 CAPSULE ORAL NIGHTLY PRN
Qty: 90 CAPSULE | Refills: 1 | Status: SHIPPED | OUTPATIENT
Start: 2021-02-01 | End: 2021-02-02 | Stop reason: SDUPTHER

## 2021-02-01 NOTE — PATIENT INSTRUCTIONS
Insomnia  Insomnia is a sleep disorder that makes it difficult to fall asleep or stay asleep. Insomnia can cause fatigue, low energy, difficulty concentrating, mood swings, and poor performance at work or school.  There are three different ways to classify insomnia:  · Difficulty falling asleep.  · Difficulty staying asleep.  · Waking up too early in the morning.  Any type of insomnia can be long-term (chronic) or short-term (acute). Both are common. Short-term insomnia usually lasts for three months or less. Chronic insomnia occurs at least three times a week for longer than three months.  What are the causes?  Insomnia may be caused by another condition, situation, or substance, such as:  · Anxiety.  · Certain medicines.  · Gastroesophageal reflux disease (GERD) or other gastrointestinal conditions.  · Asthma or other breathing conditions.  · Restless legs syndrome, sleep apnea, or other sleep disorders.  · Chronic pain.  · Menopause.  · Stroke.  · Abuse of alcohol, tobacco, or illegal drugs.  · Mental health conditions, such as depression.  · Caffeine.  · Neurological disorders, such as Alzheimer's disease.  · An overactive thyroid (hyperthyroidism).  Sometimes, the cause of insomnia may not be known.  What increases the risk?  Risk factors for insomnia include:  · Gender. Women are affected more often than men.  · Age. Insomnia is more common as you get older.  · Stress.  · Lack of exercise.  · Irregular work schedule or working night shifts.  · Traveling between different time zones.  · Certain medical and mental health conditions.  What are the signs or symptoms?  If you have insomnia, the main symptom is having trouble falling asleep or having trouble staying asleep. This may lead to other symptoms, such as:  · Feeling fatigued or having low energy.  · Feeling nervous about going to sleep.  · Not feeling rested in the morning.  · Having trouble concentrating.  · Feeling irritable, anxious, or depressed.  How  is this diagnosed?  This condition may be diagnosed based on:  · Your symptoms and medical history. Your health care provider may ask about:  ? Your sleep habits.  ? Any medical conditions you have.  ? Your mental health.  · A physical exam.  How is this treated?  Treatment for insomnia depends on the cause. Treatment may focus on treating an underlying condition that is causing insomnia. Treatment may also include:  · Medicines to help you sleep.  · Counseling or therapy.  · Lifestyle adjustments to help you sleep better.  Follow these instructions at home:  Eating and drinking    · Limit or avoid alcohol, caffeinated beverages, and cigarettes, especially close to bedtime. These can disrupt your sleep.  · Do not eat a large meal or eat spicy foods right before bedtime. This can lead to digestive discomfort that can make it hard for you to sleep.  Sleep habits    · Keep a sleep diary to help you and your health care provider figure out what could be causing your insomnia. Write down:  ? When you sleep.  ? When you wake up during the night.  ? How well you sleep.  ? How rested you feel the next day.  ? Any side effects of medicines you are taking.  ? What you eat and drink.  · Make your bedroom a dark, comfortable place where it is easy to fall asleep.  ? Put up shades or blackout curtains to block light from outside.  ? Use a white noise machine to block noise.  ? Keep the temperature cool.  · Limit screen use before bedtime. This includes:  ? Watching TV.  ? Using your smartphone, tablet, or computer.  · Stick to a routine that includes going to bed and waking up at the same times every day and night. This can help you fall asleep faster. Consider making a quiet activity, such as reading, part of your nighttime routine.  · Try to avoid taking naps during the day so that you sleep better at night.  · Get out of bed if you are still awake after 15 minutes of trying to sleep. Keep the lights down, but try reading or  doing a quiet activity. When you feel sleepy, go back to bed.  General instructions  · Take over-the-counter and prescription medicines only as told by your health care provider.  · Exercise regularly, as told by your health care provider. Avoid exercise starting several hours before bedtime.  · Use relaxation techniques to manage stress. Ask your health care provider to suggest some techniques that may work well for you. These may include:  ? Breathing exercises.  ? Routines to release muscle tension.  ? Visualizing peaceful scenes.  · Make sure that you drive carefully. Avoid driving if you feel very sleepy.  · Keep all follow-up visits as told by your health care provider. This is important.  Contact a health care provider if:  · You are tired throughout the day.  · You have trouble in your daily routine due to sleepiness.  · You continue to have sleep problems, or your sleep problems get worse.  Get help right away if:  · You have serious thoughts about hurting yourself or someone else.  If you ever feel like you may hurt yourself or others, or have thoughts about taking your own life, get help right away. You can go to your nearest emergency department or call:  · Your local emergency services (911 in the [place]  · A suicide crisis helpline, such as the National Suicide Prevention Lifeline at 1-736.548.6621. This is open 24 hours a day.  Summary  · Insomnia is a sleep disorder that makes it difficult to fall asleep or stay asleep.  · Insomnia can be long-term (chronic) or short-term (acute).  · Treatment for insomnia depends on the cause. Treatment may focus on treating an underlying condition that is causing insomnia.  · Keep a sleep diary to help you and your health care provider figure out what could be causing your insomnia.  This information is not intended to replace advice given to you by your health care provider. Make sure you discuss any questions you have with your health care provider.  Document  Revised: 11/30/2018 Document Reviewed: 09/27/2018  Elsevier Patient Education © 2020 [employer]

## 2021-02-01 NOTE — PROGRESS NOTES
Chief Complaint  Anxiety (follow up ), Depression, and Insomnia    Subjective          This patient has consented to a telehealth visit via phone. The visit was scheduled as a telehealth phone visit to comply with patient safety concerns in accordance with [facility] recommendations.  All vitals recorded within this visit are reported by the patient.  I spent 16 minutes in total including but not limited to the 11 minutes spent in direct conversation with this patient.       Charis Herbert presents to [facility] for   Anxiety  Presents for follow-up visit. Symptoms include insomnia and nervous/anxious behavior. Patient reports no decreased concentration, depressed mood, irritability, malaise or panic.     Her past medical history is significant for depression. Treatment side effects: Reports Trazodone for bedtime causes her to have more anxiety, needs to take propanolol at bedtime to help relax.   Depression  Visit Type: follow-up  Patient presents with the following symptoms: insomnia and nervousness/anxiety.  Patient is not experiencing: decreased concentration, depressed mood, irritability, malaise and panic.  Side effects:  Insomnia  Insomnia  This is a chronic problem. The current episode started more than 1 year ago. The problem has been gradually worsening. Treatments tried: Trazodone for at least 3 years (not working)      She reports had been having insomnia symptoms long before starting Viibryd for anxiety/depression.     Objective   Vital Signs:   There were no vitals taken for this visit.    Physical Exam  Neurological:      Mental Status: She is alert and oriented to person, place, and time. She is not disoriented.   Psychiatric:         Attention and Perception: She is attentive.         Speech: Speech normal.         Behavior: Behavior is cooperative.         Thought Content: Thought content normal.         Judgment: Judgment normal.        Result Review :   The following data  was reviewed by: LUIS Medina on 02/01/2021:      Data reviewed: none          Assessment and Plan    Problem List Items Addressed This Visit        Mental Health    Depression    Relevant Medications    hydrOXYzine pamoate (Vistaril) 50 MG capsule    Anxiety - Primary       Sleep    Insomnia    Relevant Medications    hydrOXYzine pamoate (Vistaril) 50 MG capsule        1. Anxiety  She was previously referred to psychiatry for management.  She reports that she did not follow-up with setting up this appointment.  Reiterated to patient importance of following up with specialist to determine best care for her comorbid psychiatric conditions (anxiety, insomnia).     2. Depression, unspecified depression type      3. Insomnia, unspecified type  Stop Trazodone, wean down over 2 week period.   Can use Vistaril PRN for sleep.     - hydrOXYzine pamoate (Vistaril) 50 MG capsule; Take 1 capsule by mouth At Night As Needed for Anxiety.  Dispense: 90 capsule; Refill: 1      Follow Up   No follow-ups on file.  Patient was given instructions and counseling regarding her condition or for health maintenance advice. Please see specific information pulled into the AVS if appropriate.

## 2021-02-02 ENCOUNTER — TELEPHONE (OUTPATIENT)
Dept: INTERNAL MEDICINE | Age: 41
End: 2021-02-02

## 2021-02-02 DIAGNOSIS — G47.00 INSOMNIA, UNSPECIFIED TYPE: ICD-10-CM

## 2021-02-02 DIAGNOSIS — F41.9 ANXIETY AND DEPRESSION: ICD-10-CM

## 2021-02-02 DIAGNOSIS — F32.A ANXIETY AND DEPRESSION: ICD-10-CM

## 2021-02-02 RX ORDER — HYDROXYZINE PAMOATE 50 MG/1
50 CAPSULE ORAL NIGHTLY PRN
Qty: 30 CAPSULE | Refills: 6 | Status: SHIPPED | OUTPATIENT
Start: 2021-02-02

## 2021-02-02 RX ORDER — PROPRANOLOL HYDROCHLORIDE 20 MG/1
TABLET ORAL
Qty: 180 TABLET | Refills: 0 | Status: SHIPPED | OUTPATIENT
Start: 2021-02-02 | End: 2021-02-03 | Stop reason: SDUPTHER

## 2021-02-02 NOTE — TELEPHONE ENCOUNTER
PATIENT CALLED AND SAID THAT THE VISTERIL 50 WAS CALLED IN, BUT INSURANCE WILL ONLY PAY FOR 30 DAY SUPPLY.     PATIENT WANTED OFFICE TO CALL IN RX REFLECTING THAT INFO, SO INSURANCE WILL PAY FOR MEDS.             Charis Herbert (Self) [phone] (H)     [facility] [address], [place] - [address] AT [facility] [address] - [phone]  - [phone]   [phone]

## 2021-02-03 ENCOUNTER — TELEPHONE (OUTPATIENT)
Dept: INTERNAL MEDICINE | Age: 41
End: 2021-02-03

## 2021-02-03 DIAGNOSIS — F32.A ANXIETY AND DEPRESSION: ICD-10-CM

## 2021-02-03 DIAGNOSIS — F41.9 ANXIETY AND DEPRESSION: ICD-10-CM

## 2021-02-03 RX ORDER — VILAZODONE HYDROCHLORIDE 40 MG/1
40 TABLET ORAL DAILY
Qty: 30 TABLET | Refills: 2 | Status: SHIPPED | OUTPATIENT
Start: 2021-02-03 | End: 2021-02-04

## 2021-02-03 RX ORDER — VILAZODONE HYDROCHLORIDE 40 MG/1
40 TABLET ORAL DAILY
Qty: 30 TABLET | Refills: 2 | Status: SHIPPED | OUTPATIENT
Start: 2021-02-03 | End: 2021-02-03 | Stop reason: SDUPTHER

## 2021-02-03 RX ORDER — PROPRANOLOL HYDROCHLORIDE 20 MG/1
20-40 TABLET ORAL 3 TIMES DAILY PRN
Qty: 180 TABLET | Refills: 0 | Status: SHIPPED | OUTPATIENT
Start: 2021-02-03

## 2021-02-03 NOTE — TELEPHONE ENCOUNTER
Caller: Charis Herbert    Relationship: Self    Best call back number: [phone]  She said she give verbal authorization for a detailed voicemail.     Medication needed:   Requested Prescriptions     Pending Prescriptions Disp Refills   • Viibryd 40 MG tablet tablet 30 tablet 0     Sig: Take 1 tablet by mouth Daily.       When do you need the refill by: ASAP     What details did the patient provide when requesting the medication: Patient is out and she was told it was denied. She said the pharmacy is also withholding her propranolol (INDERAL) 20 MG tablet. She said they told her they do not have it either. She has a new pharmacy on file.    Does the patient have less than a 3 day supply:  [x] Yes  [] No    What is the patient's preferred pharmacy: [id number] - [place], [place] - [address] AT [address] [facility] [phone] [facility] [phone] FX

## 2021-02-03 NOTE — TELEPHONE ENCOUNTER
Caller: Charis Herbert    Relationship: Self    Best call back number: [phone]     Medication needed:   Requested Prescriptions     Pending Prescriptions Disp Refills   • Viibryd 40 MG tablet tablet 30 tablet 2     Sig: Take 1 tablet by mouth Daily.       When do you need the refill by: 2-3-21    What details did the patient provide when requesting the medication: PATIENT NEEDS GENERIC VERSION OF MEDICATION DUE TO INSURANCE ISSUES    Does the patient have less than a 3 day supply:  [x] Yes  [] No    What is the patient's preferred pharmacy: [id number], [place] - [address] AT [address] [facility] [phone] [facility] [phone]

## 2021-02-04 ENCOUNTER — PATIENT MESSAGE (OUTPATIENT)
Dept: INTERNAL MEDICINE | Age: 41
End: 2021-02-04

## 2021-02-04 ENCOUNTER — TELEPHONE (OUTPATIENT)
Dept: INTERNAL MEDICINE | Age: 41
End: 2021-02-04

## 2021-02-04 DIAGNOSIS — F41.9 ANXIETY AND DEPRESSION: Primary | ICD-10-CM

## 2021-02-04 DIAGNOSIS — F32.A ANXIETY AND DEPRESSION: Primary | ICD-10-CM

## 2021-02-04 NOTE — TELEPHONE ENCOUNTER
----- Message from Kristi Oviedo MA sent at 2/4/2021  1:32 PM EST -----  Regarding: FW:medication  Contact: [phone]    ----- Message -----  From: Charis Herbert  Sent: 2/4/2021   1:25 PM EST  To: [facility] Hath[facility] [address]  Subject: RE:medication                                    Yes, I have. It only saves me 100. That still leaves me with paying 200 for it and I refuse to do that. I need an alt med, that works fairly the same as viibryd.    Charis

## 2021-02-04 NOTE — TELEPHONE ENCOUNTER
Contact patient.    Advised her that I sent in an equivalent dosage of generic for Trintellix.  This should be an even switch, meaning that she can take this instead of the Viibryd without having to taper off of it.

## 2021-02-04 NOTE — TELEPHONE ENCOUNTER
When the patient went to  her Viibryd at the pharmacy they told her it would be $400.00. There is no generic for the medication. She does want to come off the medication because it works so well for her and she is scared of the effects of coming off of it. Patient asking if this can be expedited as she states she is in a serious situation.

## 2021-02-04 NOTE — TELEPHONE ENCOUNTER
Contact patient.     Has she gone to the 's website to use the savings card? I would try this first if she has not already.     Unfortunately, I do not have any samples of that in the office.     If the savings card does not help with cost, we are going to have to switch her to alternative medication (either SSRI or SNRI). I need to know what she's taken in the past. However, we would still have to cross taper the two medications, meaning that she would have to wean off of the Viibryd for 1-2 weeks while starting a new medication.

## 2021-04-06 ENCOUNTER — OFFICE VISIT (OUTPATIENT)
Dept: INTERNAL MEDICINE | Age: 41
End: 2021-04-06

## 2021-04-06 VITALS
SYSTOLIC BLOOD PRESSURE: 126 MMHG | TEMPERATURE: 98.2 F | HEIGHT: 63 IN | OXYGEN SATURATION: 100 % | HEART RATE: 99 BPM | WEIGHT: 119 LBS | DIASTOLIC BLOOD PRESSURE: 70 MMHG | BODY MASS INDEX: 21.09 KG/M2

## 2021-04-06 DIAGNOSIS — A08.4 VIRAL GASTROENTERITIS: Primary | ICD-10-CM

## 2021-04-06 PROCEDURE — 99213 OFFICE O/P EST LOW 20 MIN: CPT | Performed by: NURSE PRACTITIONER

## 2021-04-06 RX ORDER — HYDROXYZINE HYDROCHLORIDE 10 MG/1
TABLET, FILM COATED ORAL
COMMUNITY
Start: 2021-03-22 | End: 2021-04-06

## 2021-04-06 RX ORDER — ONDANSETRON 4 MG/1
4 TABLET, ORALLY DISINTEGRATING ORAL EVERY 8 HOURS PRN
Qty: 15 TABLET | Refills: 0 | Status: SHIPPED | OUTPATIENT
Start: 2021-04-06 | End: 2021-04-16 | Stop reason: SDUPTHER

## 2021-04-06 RX ORDER — MIRTAZAPINE 15 MG/1
15 TABLET, FILM COATED ORAL NIGHTLY
COMMUNITY
Start: 2021-03-22

## 2021-04-06 RX ORDER — VORTIOXETINE 20 MG/1
20 TABLET, FILM COATED ORAL DAILY
COMMUNITY

## 2021-04-06 NOTE — PROGRESS NOTES
"Chief Complaint  Diarrhea, Vomiting, Fatigue, and Headache    Subjective          Charis Herbert presents to [facility]  Diarrhea   This is a new problem. The current episode started yesterday. The stool consistency is described as watery. Associated symptoms include bloating and vomiting. Pertinent negatives include no abdominal pain, coughing, fever or myalgias. Risk factors: works at . She has tried anti-motility drug for the symptoms.   Vomiting   This is a new problem. Maximum temperature: 99.8. Associated symptoms include diarrhea. Pertinent negatives include no abdominal pain, coughing, dizziness, fever or myalgias.   Works at a . No one at  has had similar symptoms. No known exposure to COVID, no URI symptoms.     Objective   Vital Signs:   /70   Pulse 99   Temp 98.2 °F (36.8 °C) (Temporal)   Ht 160 cm (62.99\")   Wt 54 kg (119 lb)   SpO2 100%   BMI 21.09 kg/m²     Physical Exam  Vitals and nursing note reviewed.   Constitutional:       General: She is not in acute distress.     Appearance: She is well-developed. She is not ill-appearing.   Cardiovascular:      Rate and Rhythm: Normal rate and regular rhythm.      Heart sounds: Normal heart sounds. No murmur heard.     Pulmonary:      Effort: Pulmonary effort is normal.      Breath sounds: Normal breath sounds.   Abdominal:      General: Bowel sounds are normal.      Palpations: Abdomen is soft.      Tenderness: There is generalized abdominal tenderness (mildly tender).   Skin:     General: Skin is warm and dry.   Neurological:      Mental Status: She is alert.   Psychiatric:         Speech: Speech normal.         Behavior: Behavior normal. Behavior is cooperative.         Thought Content: Thought content normal.         Judgment: Judgment normal.        Result Review :   The following data was reviewed by: LUIS Medina on 04/06/2021:             Assessment and Plan    Diagnoses and all " orders for this visit:    1. Viral gastroenteritis (Primary)  Discussed conservative treatment for viral gastroenteritis, including rest, increased PO fluids and hydration. Instructed patient to follow up if no improvement in 2-3 days or if new symptoms such as abdominal pain, fever, worsening diarrhea, bloody diarrhea, unable to tolerate PO intake, fever, or urinary symptoms.   Form completed for patient to return to work once symptom free for 24 hours.     -     ondansetron ODT (Zofran ODT) 4 MG disintegrating tablet; Place 1 tablet on the tongue Every 8 (Eight) Hours As Needed for Nausea or Vomiting.  Dispense: 15 tablet; Refill: 0        Follow Up   Return for Follow up as needed if no improvement in 3-4 days .  Patient was given instructions and counseling regarding her condition or for health maintenance advice. Please see specific information pulled into the AVS if appropriate.

## 2021-04-06 NOTE — PATIENT INSTRUCTIONS
Viral Gastroenteritis, Adult    Viral gastroenteritis is also known as the stomach flu. This condition may affect your stomach, small intestine, and large intestine. It can cause sudden watery diarrhea, fever, and vomiting. This condition is caused by many different viruses. These viruses can be passed from person to person very easily (are contagious).  Diarrhea and vomiting can make you feel weak and cause you to become dehydrated. You may not be able to keep fluids down. Dehydration can make you tired and thirsty, cause you to have a dry mouth, and decrease how often you urinate. It is important to replace the fluids that you lose from diarrhea and vomiting.  What are the causes?  Gastroenteritis is caused by many viruses, including rotavirus and norovirus. Norovirus is the most common cause in adults. You can get sick after being exposed to the viruses from other people. You can also get sick by:  · Eating food, drinking water, or touching a surface contaminated with one of these viruses.  · Sharing utensils or other personal items with an infected person.  What increases the risk?  You are more likely to develop this condition if you:  · Have a weak body defense system (immune system).  · Live with one or more children who are younger than 2 years old.  · Live in a nursing home.  · Travel on cruise ships.  What are the signs or symptoms?  Symptoms of this condition start suddenly 1-3 days after exposure to a virus. Symptoms may last for a few days or for as long as a week. Common symptoms include watery diarrhea and vomiting. Other symptoms include:  · Fever.  · Headache.  · Fatigue.  · Pain in the abdomen.  · Chills.  · Weakness.  · Nausea.  · Muscle aches.  · Loss of appetite.  How is this diagnosed?  This condition is diagnosed with a medical history and physical exam. You may also have a stool test to check for viruses or other infections.  How is this treated?  This condition typically goes away on its  own. The focus of treatment is to prevent dehydration and restore lost fluids (rehydration). This condition may be treated with:  · An oral rehydration solution (ORS) to replace important salts and minerals (electrolytes) in your body. Take this if told by your health care provider. This is a drink that is sold at pharmacies and retail stores.  · Medicines to help with your symptoms.  · Probiotic supplements to reduce symptoms of diarrhea.  · Fluids given through an IV, if dehydration is severe.  Older adults and people with other diseases or a weak immune system are at higher risk for dehydration.  Follow these instructions at home:    Eating and drinking    · Take an ORS as told by your health care provider.  · Drink clear fluids in small amounts as you are able. Clear fluids include:  ? Water.  ? Ice chips.  ? Diluted fruit juice.  ? Low-calorie sports drinks.  · Drink enough fluid to keep your urine pale yellow.  · Eat small amounts of healthy foods every 3-4 hours as you are able. This may include whole grains, fruits, vegetables, lean meats, and yogurt.  · Avoid fluids that contain a lot of sugar or caffeine, such as energy drinks, sports drinks, and soda.  · Avoid spicy or fatty foods.  · Avoid alcohol.  General instructions  · Wash your hands often, especially after having diarrhea or vomiting. If soap and water are not available, use hand .  · Make sure that all people in your household wash their hands well and often.  · Take over-the-counter and prescription medicines only as told by your health care provider.  · Rest at home while you recover.  · Watch your condition for any changes.  · Take a warm bath to relieve any burning or pain from frequent diarrhea episodes.  · Keep all follow-up visits as told by your health care provider. This is important.  Contact a health care provider if you:  · Cannot keep fluids down.  · Have symptoms that get worse.  · Have new symptoms.  · Feel light-headed or  dizzy.  · Have muscle cramps.  Get help right away if you:  · Have chest pain.  · Feel extremely weak or you faint.  · See blood in your vomit.  · Have vomit that looks like coffee grounds.  · Have bloody or black stools or stools that look like tar.  · Have a severe headache, a stiff neck, or both.  · Have a rash.  · Have severe pain, cramping, or bloating in your abdomen.  · Have trouble breathing or you are breathing very quickly.  · Have a fast heartbeat.  · Have skin that feels cold and clammy.  · Feel confused.  · Have pain when you urinate.  · Have signs of dehydration, such as:  ? Dark urine, very little urine, or no urine.  ? Cracked lips.  ? Dry mouth.  ? Sunken eyes.  ? Sleepiness.  ? Weakness.  Summary  · Viral gastroenteritis is also known as the stomach flu. It can cause sudden watery diarrhea, fever, and vomiting.  · This condition can be passed from person to person very easily (is contagious).  · Take an ORS if told by your health care provider. This is a drink that is sold at pharmacies and retail stores.  · Wash your hands often, especially after having diarrhea or vomiting. If soap and water are not available, use hand .  This information is not intended to replace advice given to you by your health care provider. Make sure you discuss any questions you have with your health care provider.  Document Revised: 06/05/2020 Document Reviewed: 10/23/2019  [employer] Patient Education © 2021 [employer]

## 2021-04-16 ENCOUNTER — OFFICE VISIT (OUTPATIENT)
Dept: INTERNAL MEDICINE | Age: 41
End: 2021-04-16

## 2021-04-16 VITALS
SYSTOLIC BLOOD PRESSURE: 110 MMHG | DIASTOLIC BLOOD PRESSURE: 76 MMHG | OXYGEN SATURATION: 100 % | HEIGHT: 62 IN | TEMPERATURE: 98.2 F | WEIGHT: 116.4 LBS | BODY MASS INDEX: 21.42 KG/M2 | HEART RATE: 104 BPM

## 2021-04-16 DIAGNOSIS — A08.4 VIRAL GASTROENTERITIS: ICD-10-CM

## 2021-04-16 PROCEDURE — 99213 OFFICE O/P EST LOW 20 MIN: CPT | Performed by: NURSE PRACTITIONER

## 2021-04-16 RX ORDER — DICYCLOMINE HYDROCHLORIDE 10 MG/1
10 CAPSULE ORAL 2 TIMES DAILY PRN
Qty: 14 CAPSULE | Refills: 0 | Status: SHIPPED | OUTPATIENT
Start: 2021-04-16

## 2021-04-16 RX ORDER — ONDANSETRON 4 MG/1
4 TABLET, ORALLY DISINTEGRATING ORAL EVERY 8 HOURS PRN
Qty: 15 TABLET | Refills: 0 | Status: SHIPPED | OUTPATIENT
Start: 2021-04-16

## 2021-04-16 NOTE — PROGRESS NOTES
"    I N T E R N A L  M E D I C I N E  MALIK PASTOR, APRN      ENCOUNTER DATE:  04/16/2021    Charis Herbert / 41 y.o. / female      CHIEF COMPLAINT / REASON FOR OFFICE VISIT     Dizziness (x10 off and on, viral gastroenteritis), Nausea, and Diarrhea      ASSESSMENT & PLAN     1. Viral gastroenteritis  - Comprehensive metabolic panel  - CBC w AUTO Differential  - Bentyl 10mg BID as needed for abdominal cramping  - ondansetron ODT (Zofran ODT) 4 MG disintegrating tablet; Place 1 tablet on the tongue Every 8 (Eight) Hours As Needed for Nausea or Vomiting.  Dispense: 15 tablet; Refill: 0    Orders Placed This Encounter   Procedures   • Comprehensive metabolic panel   • CBC w AUTO Differential     New Medications Ordered This Visit   Medications   • dicyclomine (Bentyl) 10 MG capsule     Sig: Take 1 capsule by mouth 2 (Two) Times a Day As Needed (abdominal cramping).     Dispense:  14 capsule     Refill:  0   • ondansetron ODT (Zofran ODT) 4 MG disintegrating tablet     Sig: Place 1 tablet on the tongue Every 8 (Eight) Hours As Needed for Nausea or Vomiting.     Dispense:  15 tablet     Refill:  0       SUMMARY/DISCUSSION  • Patient provided container for stool sample she is to bring back if symptoms do not improve by Monday, especially with working in childcare  • Advised emergency elevation if she becomes dehydrated     Next Appointment with me: Visit date not found    No follow-ups on file.      VITAL SIGNS     Visit Vitals  /76   Pulse 104   Temp 98.2 °F (36.8 °C) (Temporal)   Ht 157.5 cm (62\")   Wt 52.8 kg (116 lb 6.4 oz)   LMP 04/01/2021   SpO2 100%   Breastfeeding No   BMI 21.29 kg/m²     Wt Readings from Last 3 Encounters:   04/16/21 52.8 kg (116 lb 6.4 oz)   04/06/21 54 kg (119 lb)   11/20/19 69.6 kg (153 lb 6.4 oz)     Body mass index is 21.29 kg/m².      MEDICATIONS AT THE TIME OF OFFICE VISIT     Current Outpatient Medications on File Prior to Visit   Medication Sig   • albuterol 108 (90 Base) MCG/ACT " inhaler Inhale 2 puffs Every 4 (Four) Hours As Needed.   • fexofenadine (ALLEGRA ALLERGY) 180 MG tablet Take 1 tablet by mouth Daily.   • hydrOXYzine pamoate (Vistaril) 50 MG capsule Take 1 capsule by mouth At Night As Needed for Anxiety.   • mirtazapine (REMERON) 15 MG tablet Take 15 mg by mouth Every Night.   • Vortioxetine HBr (Trintellix) 20 MG tablet Take 20 mg by mouth Daily.   • [DISCONTINUED] ondansetron ODT (Zofran ODT) 4 MG disintegrating tablet Place 1 tablet on the tongue Every 8 (Eight) Hours As Needed for Nausea or Vomiting.   • propranolol (INDERAL) 20 MG tablet Take 1-2 tablets by mouth 3 (Three) Times a Day As Needed (anxiety).     No current facility-administered medications on file prior to visit.         HISTORY OF PRESENT ILLNESS     Presents with reoccurring symptoms after viral gastroenteritis diagnosis with PCP on April 6. Given Zofran and symptoms stopped for 5-7 days. Today she again began to feel nauseous with diarrhea and x 1 vomiting. No blood in her stools or dark stools. No fever, chills or malaise. Positive for abdominal cramping but no pain. Adequately hydrating.     REVIEW OF SYSTEMS     Constitutional neg except per HPI   Resp neg  CV neg  GI nausea, vomit x 1 and diarrhea   Neuro dizziness after vomiting      PHYSICAL EXAMINATION     Physical Exam  Constitutional  No distress  Cardiovascular Rate  normal . Rhythm: regular . Heart sounds:  normal  Pulmonary/Chest  Effort normal. Breath sounds:  normal  GI neg tenderness abd  Psychiatric  Alert. Judgment and thought content normal. Mood normal     REVIEWED DATA     Labs:   Lab Results   Component Value Date    BUN 6 11/20/2019    CREATININE 0.79 11/20/2019    EGFRIFNONA 81 11/20/2019    EGFRIFAFRI 99 11/20/2019    BCR 7.6 11/20/2019    K 4.5 11/20/2019    CO2 26.0 11/20/2019    CALCIUM 9.5 11/20/2019    PROTENTOTREF 7.0 11/20/2019    ALBUMIN 4.50 11/20/2019    LABIL2 1.8 11/20/2019    AST 21 11/20/2019    ALT 22 11/20/2019     Lab  Results   Component Value Date    WBC 8.15 12/13/2018    HGB 13.3 12/13/2018    HCT 42.2 12/13/2018    MCV 89.8 12/13/2018     12/13/2018     Imaging:           Medical Tests:             Summary of old records / correspondence / consultant report:           Request outside records:           *Examiner was wearing medical surgical mask, face shield and exam gloves during the entire duration of the visit. Patient was masked the entire time.   Minimum social distance of 6 ft maintained entire visit except if physical contact was necessary as documented.     **Dragon Disclaimer:   Much of this encounter note is an electronic transcription/translation of spoken language to printed text. The electronic translation of spoken language may permit erroneous, or at times, nonsensical words or phrases to be inadvertently transcribed. Although I have reviewed the note for such errors, some may still exist.

## 2021-04-17 LAB
ALBUMIN SERPL-MCNC: 4.8 G/DL (ref 3.5–5.2)
ALBUMIN/GLOB SERPL: 1.8 G/DL
ALP SERPL-CCNC: 112 U/L (ref 39–117)
ALT SERPL-CCNC: 45 U/L (ref 1–33)
AST SERPL-CCNC: 24 U/L (ref 1–32)
BASOPHILS # BLD AUTO: 0.08 10*3/MM3 (ref 0–0.2)
BASOPHILS NFR BLD AUTO: 0.3 % (ref 0–1.5)
BILIRUB SERPL-MCNC: 0.5 MG/DL (ref 0–1.2)
BUN SERPL-MCNC: 12 MG/DL (ref 6–20)
BUN/CREAT SERPL: 17.6 (ref 7–25)
CALCIUM SERPL-MCNC: 10.3 MG/DL (ref 8.6–10.5)
CHLORIDE SERPL-SCNC: 100 MMOL/L (ref 98–107)
CO2 SERPL-SCNC: 26.2 MMOL/L (ref 22–29)
CREAT SERPL-MCNC: 0.68 MG/DL (ref 0.57–1)
EOSINOPHIL # BLD AUTO: 0.13 10*3/MM3 (ref 0–0.4)
EOSINOPHIL NFR BLD AUTO: 0.5 % (ref 0.3–6.2)
ERYTHROCYTE [DISTWIDTH] IN BLOOD BY AUTOMATED COUNT: 12.8 % (ref 12.3–15.4)
GLOBULIN SER CALC-MCNC: 2.6 GM/DL
GLUCOSE SERPL-MCNC: 96 MG/DL (ref 65–99)
HCT VFR BLD AUTO: 43.8 % (ref 34–46.6)
HGB BLD-MCNC: 14.3 G/DL (ref 12–15.9)
IMM GRANULOCYTES # BLD AUTO: 0.17 10*3/MM3 (ref 0–0.05)
IMM GRANULOCYTES NFR BLD AUTO: 0.7 % (ref 0–0.5)
LYMPHOCYTES # BLD AUTO: 0.49 10*3/MM3 (ref 0.7–3.1)
LYMPHOCYTES NFR BLD AUTO: 1.9 % (ref 19.6–45.3)
MCH RBC QN AUTO: 29.2 PG (ref 26.6–33)
MCHC RBC AUTO-ENTMCNC: 32.6 G/DL (ref 31.5–35.7)
MCV RBC AUTO: 89.4 FL (ref 79–97)
MONOCYTES # BLD AUTO: 1.57 10*3/MM3 (ref 0.1–0.9)
MONOCYTES NFR BLD AUTO: 6.2 % (ref 5–12)
NEUTROPHILS # BLD AUTO: 22.94 10*3/MM3 (ref 1.7–7)
NEUTROPHILS NFR BLD AUTO: 90.4 % (ref 42.7–76)
NRBC BLD AUTO-RTO: 0 /100 WBC (ref 0–0.2)
PLATELET # BLD AUTO: 409 10*3/MM3 (ref 140–450)
POTASSIUM SERPL-SCNC: 4.7 MMOL/L (ref 3.5–5.2)
PROT SERPL-MCNC: 7.4 G/DL (ref 6–8.5)
RBC # BLD AUTO: 4.9 10*6/MM3 (ref 3.77–5.28)
SODIUM SERPL-SCNC: 137 MMOL/L (ref 136–145)
WBC # BLD AUTO: 25.38 10*3/MM3 (ref 3.4–10.8)

## 2021-04-18 DIAGNOSIS — R19.7 DIARRHEA, UNSPECIFIED TYPE: Primary | ICD-10-CM

## 2021-04-19 ENCOUNTER — HOSPITAL ENCOUNTER (EMERGENCY)
Facility: HOSPITAL | Age: 41
Discharge: HOME | End: 2021-04-19
Admitting: EMERGENCY MEDICINE

## 2021-04-19 ENCOUNTER — HOSPITAL ENCOUNTER (EMERGENCY)
Facility: HOSPITAL | Age: 41
Discharge: HOME | End: 2021-04-19

## 2021-04-19 ENCOUNTER — APPOINTMENT (OUTPATIENT)
Dept: CT IMAGING | Facility: HOSPITAL | Age: 41
End: 2021-04-19

## 2021-04-19 ENCOUNTER — TELEPHONE (OUTPATIENT)
Dept: INTERNAL MEDICINE | Age: 41
End: 2021-04-19

## 2021-04-19 VITALS
HEIGHT: 62 IN | SYSTOLIC BLOOD PRESSURE: 104 MMHG | RESPIRATION RATE: 16 BRPM | DIASTOLIC BLOOD PRESSURE: 75 MMHG | TEMPERATURE: 98.2 F | WEIGHT: 121.47 LBS | BODY MASS INDEX: 22.35 KG/M2 | OXYGEN SATURATION: 100 % | HEART RATE: 80 BPM

## 2021-04-19 VITALS
TEMPERATURE: 98.7 F | HEART RATE: 112 BPM | SYSTOLIC BLOOD PRESSURE: 121 MMHG | RESPIRATION RATE: 16 BRPM | OXYGEN SATURATION: 95 % | DIASTOLIC BLOOD PRESSURE: 87 MMHG

## 2021-04-19 DIAGNOSIS — R11.2 NON-INTRACTABLE VOMITING WITH NAUSEA, UNSPECIFIED VOMITING TYPE: Primary | ICD-10-CM

## 2021-04-19 DIAGNOSIS — N83.209 CYST OF OVARY, UNSPECIFIED LATERALITY: ICD-10-CM

## 2021-04-19 DIAGNOSIS — K52.9 COLITIS: ICD-10-CM

## 2021-04-19 DIAGNOSIS — R19.7 DIARRHEA, UNSPECIFIED TYPE: ICD-10-CM

## 2021-04-19 LAB
ALBUMIN SERPL-MCNC: 3.9 G/DL (ref 3.5–5.2)
ALBUMIN/GLOB SERPL: 1.6 G/DL
ALP SERPL-CCNC: 86 U/L (ref 39–117)
ALT SERPL W P-5'-P-CCNC: 48 U/L (ref 1–33)
ANION GAP SERPL CALCULATED.3IONS-SCNC: 11 MMOL/L (ref 5–15)
AST SERPL-CCNC: 29 U/L (ref 1–32)
B-HCG UR QL: NEGATIVE
BASOPHILS # BLD AUTO: 0 10*3/MM3 (ref 0–0.2)
BASOPHILS NFR BLD AUTO: 0.4 % (ref 0–1.5)
BILIRUB SERPL-MCNC: 0.2 MG/DL (ref 0–1.2)
BILIRUB UR QL STRIP: NEGATIVE
BUN SERPL-MCNC: 9 MG/DL (ref 6–20)
BUN/CREAT SERPL: 13.6 (ref 7–25)
CALCIUM SPEC-SCNC: 9.3 MG/DL (ref 8.6–10.5)
CHLORIDE SERPL-SCNC: 104 MMOL/L (ref 98–107)
CLARITY UR: CLEAR
CO2 SERPL-SCNC: 25 MMOL/L (ref 22–29)
COLOR UR: YELLOW
CREAT SERPL-MCNC: 0.66 MG/DL (ref 0.57–1)
D-LACTATE SERPL-SCNC: 1.4 MMOL/L (ref 0.5–2)
DEPRECATED RDW RBC AUTO: 39.4 FL (ref 37–54)
EOSINOPHIL # BLD AUTO: 0.5 10*3/MM3 (ref 0–0.4)
EOSINOPHIL NFR BLD AUTO: 6.2 % (ref 0.3–6.2)
ERYTHROCYTE [DISTWIDTH] IN BLOOD BY AUTOMATED COUNT: 13 % (ref 12.3–15.4)
GFR SERPL CREATININE-BSD FRML MDRD: 99 ML/MIN/1.73
GLOBULIN UR ELPH-MCNC: 2.5 GM/DL
GLUCOSE SERPL-MCNC: 93 MG/DL (ref 65–99)
GLUCOSE UR STRIP-MCNC: NEGATIVE MG/DL
HCT VFR BLD AUTO: 35.8 % (ref 34–46.6)
HGB BLD-MCNC: 12.5 G/DL (ref 12–15.9)
HGB UR QL STRIP.AUTO: NEGATIVE
KETONES UR QL STRIP: NEGATIVE
LEUKOCYTE ESTERASE UR QL STRIP.AUTO: NEGATIVE
LIPASE SERPL-CCNC: 49 U/L (ref 13–60)
LYMPHOCYTES # BLD AUTO: 1.2 10*3/MM3 (ref 0.7–3.1)
LYMPHOCYTES NFR BLD AUTO: 13.9 % (ref 19.6–45.3)
MCH RBC QN AUTO: 30 PG (ref 26.6–33)
MCHC RBC AUTO-ENTMCNC: 34.8 G/DL (ref 31.5–35.7)
MCV RBC AUTO: 86.2 FL (ref 79–97)
MONOCYTES # BLD AUTO: 0.6 10*3/MM3 (ref 0.1–0.9)
MONOCYTES NFR BLD AUTO: 7.4 % (ref 5–12)
NEUTROPHILS NFR BLD AUTO: 6.2 10*3/MM3 (ref 1.7–7)
NEUTROPHILS NFR BLD AUTO: 72.1 % (ref 42.7–76)
NITRITE UR QL STRIP: NEGATIVE
NRBC BLD AUTO-RTO: 0.1 /100 WBC (ref 0–0.2)
PH UR STRIP.AUTO: 6 [PH] (ref 5–8)
PLATELET # BLD AUTO: 264 10*3/MM3 (ref 140–450)
PMV BLD AUTO: 8.3 FL (ref 6–12)
POTASSIUM SERPL-SCNC: 3.8 MMOL/L (ref 3.5–5.2)
PROT SERPL-MCNC: 6.4 G/DL (ref 6–8.5)
PROT UR QL STRIP: NEGATIVE
RBC # BLD AUTO: 4.15 10*6/MM3 (ref 3.77–5.28)
SODIUM SERPL-SCNC: 140 MMOL/L (ref 136–145)
SP GR UR STRIP: 1.02 (ref 1–1.03)
UROBILINOGEN UR QL STRIP: NORMAL
WBC # BLD AUTO: 8.6 10*3/MM3 (ref 3.4–10.8)

## 2021-04-19 PROCEDURE — 96374 THER/PROPH/DIAG INJ IV PUSH: CPT

## 2021-04-19 PROCEDURE — 99211 OFF/OP EST MAY X REQ PHY/QHP: CPT

## 2021-04-19 PROCEDURE — 83605 ASSAY OF LACTIC ACID: CPT

## 2021-04-19 PROCEDURE — 80053 COMPREHEN METABOLIC PANEL: CPT | Performed by: NURSE PRACTITIONER

## 2021-04-19 PROCEDURE — 0 IOPAMIDOL PER 1 ML: Performed by: NURSE PRACTITIONER

## 2021-04-19 PROCEDURE — 83690 ASSAY OF LIPASE: CPT | Performed by: NURSE PRACTITIONER

## 2021-04-19 PROCEDURE — 85025 COMPLETE CBC W/AUTO DIFF WBC: CPT | Performed by: NURSE PRACTITIONER

## 2021-04-19 PROCEDURE — 81003 URINALYSIS AUTO W/O SCOPE: CPT | Performed by: NURSE PRACTITIONER

## 2021-04-19 PROCEDURE — 74177 CT ABD & PELVIS W/CONTRAST: CPT

## 2021-04-19 PROCEDURE — 81025 URINE PREGNANCY TEST: CPT | Performed by: NURSE PRACTITIONER

## 2021-04-19 PROCEDURE — 99284 EMERGENCY DEPT VISIT MOD MDM: CPT

## 2021-04-19 PROCEDURE — 25010000002 ONDANSETRON PER 1 MG: Performed by: NURSE PRACTITIONER

## 2021-04-19 RX ORDER — SODIUM CHLORIDE 0.9 % (FLUSH) 0.9 %
10 SYRINGE (ML) INJECTION AS NEEDED
Status: DISCONTINUED | OUTPATIENT
Start: 2021-04-19 | End: 2021-04-19 | Stop reason: HOSPADM

## 2021-04-19 RX ORDER — AMOXICILLIN AND CLAVULANATE POTASSIUM 875; 125 MG/1; MG/1
1 TABLET, FILM COATED ORAL 2 TIMES DAILY
Qty: 14 TABLET | Refills: 0 | Status: SHIPPED | OUTPATIENT
Start: 2021-04-19

## 2021-04-19 RX ORDER — ONDANSETRON 2 MG/ML
4 INJECTION INTRAMUSCULAR; INTRAVENOUS ONCE
Status: COMPLETED | OUTPATIENT
Start: 2021-04-19 | End: 2021-04-19

## 2021-04-19 RX ADMIN — SODIUM CHLORIDE 500 ML: 9 INJECTION, SOLUTION INTRAVENOUS at 17:21

## 2021-04-19 RX ADMIN — ONDANSETRON 4 MG: 2 INJECTION INTRAMUSCULAR; INTRAVENOUS at 17:20

## 2021-04-19 RX ADMIN — IOPAMIDOL 100 ML: 755 INJECTION, SOLUTION INTRAVENOUS at 19:00

## 2021-04-19 RX ADMIN — Medication 10 ML: at 17:21

## 2021-04-19 NOTE — ED PROVIDER NOTES
Subjective   Patient presents with:  Abnormal Lab: Pt was sent by PMD for elevated WBC, Pt has been vomiting and diarrhea for 12 days    Rea CordobaLUIS    Patient is a 41-year-old female presents emergency department with a complaint of nausea, vomiting and diarrhea.  She reports this initially began about 12 days ago.  Patient reports that she works in a , she does report though there have been children have been sick, but only one other child with similar symptoms.  Patient reports she was seen by her primary care provider, she did have lab work recently, and had an elevated white blood cell count of 25,000, which she was subsequently referred to the ER for further evaluation.  Patient's not had any recent antibiotic biotic use.  No recent travel.  No fever or chills.  No hematemesis, coffee-ground emesis, hematochezia melena.  No urinary symptoms.  No abnormal vaginal discharge or bleeding.          Review of Systems   Constitutional: Negative for chills and fever.   Respiratory: Negative for shortness of breath.    Cardiovascular: Negative for chest pain.   Gastrointestinal: Positive for diarrhea, nausea and vomiting. Negative for abdominal pain.   Genitourinary: Negative for difficulty urinating, dysuria, flank pain and menstrual problem.   Musculoskeletal: Negative for back pain and neck pain.   Skin: Negative for color change and rash.       Past Medical History:   Diagnosis Date   • Allergic    • Anxiety    • Depression    • Hyperlipidemia    • Migraine with aura    • Sciatica        Allergies   Allergen Reactions   • Dilaudid [Hydromorphone] Rash       Past Surgical History:   Procedure Laterality Date   • FACIAL RECONSTRUCTION SURGERY      due to infection after root canal       Family History   Problem Relation Age of Onset   • Breast cancer Mother    • Depression Mother    • Miscarriages / Stillbirths Sister    • Alcohol abuse Brother    • Drug abuse Brother    • Breast cancer Maternal  Aunt    • Breast cancer Maternal Grandmother    • Stroke Maternal Grandmother    • Alcohol abuse Maternal Grandmother    • Alcohol abuse Maternal Grandfather    • No Known Problems Father        Social History     Socioeconomic History   • Marital status:      Spouse name: Not on file   • Number of children: Not on file   • Years of education: Not on file   • Highest education level: Not on file   Tobacco Use   • Smoking status: Former Smoker     Quit date: 2019     Years since quittin.2   • Smokeless tobacco: Never Used   Vaping Use   • Vaping Use: Never used   Substance and Sexual Activity   • Alcohol use: Yes     Comment: intermittently   • Drug use: No   • Sexual activity: Yes     Partners: Female           Objective   Physical Exam  Vitals and nursing note reviewed.   Constitutional:       General: She is not in acute distress.     Appearance: Normal appearance. She is not ill-appearing, toxic-appearing or diaphoretic.   HENT:      Head: Normocephalic and atraumatic.      Nose: Nose normal.      Mouth/Throat:      Mouth: Mucous membranes are moist.      Pharynx: Oropharynx is clear.   Eyes:      Extraocular Movements: Extraocular movements intact.      Conjunctiva/sclera: Conjunctivae normal.      Pupils: Pupils are equal, round, and reactive to light.   Cardiovascular:      Rate and Rhythm: Normal rate and regular rhythm.      Heart sounds: Normal heart sounds. No murmur heard.   No friction rub. No gallop.    Pulmonary:      Effort: Pulmonary effort is normal.      Breath sounds: Normal breath sounds.   Abdominal:      General: Bowel sounds are normal.      Palpations: Abdomen is soft.      Tenderness: There is no abdominal tenderness. There is no guarding or rebound.   Musculoskeletal:         General: Normal range of motion.      Cervical back: Normal range of motion and neck supple.   Skin:     General: Skin is warm and dry.      Capillary Refill: Capillary refill takes less than 2 seconds.  "  Neurological:      Mental Status: She is alert and oriented to person, place, and time.   Psychiatric:         Mood and Affect: Mood normal.         Behavior: Behavior normal.         Procedures           ED Course  /75   Pulse 80   Temp 98.2 °F (36.8 °C) (Oral)   Resp 16   Ht 157.5 cm (62\")   Wt 55.1 kg (121 lb 7.6 oz)   LMP 04/01/2021   SpO2 100%   BMI 22.22 kg/m²   Labs Reviewed   COMPREHENSIVE METABOLIC PANEL - Abnormal; Notable for the following components:       Result Value    ALT (SGPT) 48 (*)     All other components within normal limits    Narrative:     GFR Normal >60  Chronic Kidney Disease <60  Kidney Failure <15     CBC WITH AUTO DIFFERENTIAL - Abnormal; Notable for the following components:    Lymphocyte % 13.9 (*)     Eosinophils, Absolute 0.50 (*)     All other components within normal limits   LIPASE - Normal   PREGNANCY, URINE - Normal   URINALYSIS W/ CULTURE IF INDICATED - Normal    Narrative:     Urine microscopic not indicated.   POC LACTATE - Normal   POC LACTATE   CBC AND DIFFERENTIAL    Narrative:     The following orders were created for panel order CBC & Differential.  Procedure                               Abnormality         Status                     ---------                               -----------         ------                     CBC Auto Differential[[id number]]        Abnormal            Final result                 Please view results for these tests on the individual orders.     Medications   sodium chloride 0.9 % flush 10 mL (10 mL Intravenous Given 4/19/21 1721)   ondansetron (ZOFRAN) injection 4 mg (4 mg Intravenous Given 4/19/21 1720)   sodium chloride 0.9 % bolus 500 mL (0 mL Intravenous Stopped 4/19/21 1843)   iopamidol (ISOVUE-370) 76 % injection 100 mL (100 mL Intravenous Given 4/19/21 1900)     CT Abdomen Pelvis With Contrast    Result Date: 4/19/2021  1. Negative for evidence of appendicitis. 2. The gas in the cecum is most likely trapped between " liquid stool and bowel wall. Pneumatosis is not considered likely. Clinical correlation recommended. 3. The appearance of the ascending colon and hepatic flexure may be due to wall thickening from a nonspecific colitis or due to contraction. 4. There is a small amount of free fluid in the pelvis which is nonspecific. 5. Appearance of the distal stomach is most likely due to contraction, with mild gastritis less likely. 6. Question left ovarian cyst measuring up to 3.5 cm.  Electronically Signed By-Betzy Harrington MD On:4/19/2021 7:31 PM This report was finalized on [id number] by  Betzy Harrington MD.          Appropriate PPE was worn during the duration of the care for this patient while in the emergency department per [facility] guidelines                                     MDM  Number of Diagnoses or Management Options  Colitis  Cyst of ovary, unspecified laterality  Diarrhea, unspecified type  Non-intractable vomiting with nausea, unspecified vomiting type  Diagnosis management comments: ° Differentials: Gastroenteritis, bowel obstruction, gastritis   This list is not all inclusive and does not constitute the entireity of considered causes.     ° Labs reviewed by me and significant for the following: Abnormal Labs Reviewed  COMPREHENSIVE METABOLIC PANEL - Abnormal; Notable for the following components:     ALT (SGPT)                    48 (*)              All other components within normal limits         Narrative: GFR Normal >60                  Chronic Kidney Disease <60                  Kidney Failure <15                    CBC WITH AUTO DIFFERENTIAL - Abnormal; Notable for the following components:     Lymphocyte %                  13.9 (*)               Eosinophils, Absolute         0.50 (*)            All other components within normal limits      ° Imaging, Interpreted per radiologist, independently viewed by myself: CT Abdomen Pelvis With Contrast    Result Date: 4/19/2021  1. Negative for  "evidence of appendicitis. 2. The gas in the cecum is most likely trapped between liquid stool and bowel wall. Pneumatosis is not considered likely. Clinical correlation recommended. 3. The appearance of the ascending colon and hepatic flexure may be due to wall thickening from a nonspecific colitis or due to contraction. 4. There is a small amount of free fluid in the pelvis which is nonspecific. 5. Appearance of the distal stomach is most likely due to contraction, with mild gastritis less likely. 6. Question left ovarian cyst measuring up to 3.5 cm.  Electronically Signed By-Betzy Harrington MD On:4/19/2021 7:31 PM This report was finalized on [id number] by  Betzy Harrington MD.        ° Patient was brought back to the emergency department room for evaluation and  placed on appropriate monitoring.   IV was established, blood work obtained.  Vital signs have  been reviewed. Patient is afebrile.  We discussed incidental radiological findings, we also discussed her LFTs being slightly elevated, which was present on her previous lab work with her PCP.  I feel pneumatosis is not likely, and I feel it would be appropriate to treat the patient for nonspecific colitis, she will be placed on Augmentin.  Patient reported she was upset that she \"does not have answers\", however I discussed with her at the bedside emergency department role in rule of life-threatening emergency, and the need to follow-up with primary care, as well as gastroenterology.  Is been tolerating oral fluids, and she is remained afebrile nontoxic in appearance.    ° Plan and Disposition: I spoke with the patient at the bedside regarding their plan of care, discharge instruction, home care, prescriptions, and importance follow-up.  We discussed test results at the bedside.  Patient was made aware of indications to return to the emergency department.  Patient agrees with the current plan of care for discharge, verbalized understanding of all " instructions    Pt is aware that discharge does not mean that nothing is wrong but it indicates no emergency is present and they must continue care with follow-up as given below or physician of their choice                         Amount and/or Complexity of Data Reviewed  Clinical lab tests: reviewed  Tests in the radiology section of CPT®: reviewed  Decide to obtain previous medical records or to obtain history from someone other than the patient: yes    Patient Progress  Patient progress: stable      Final diagnoses:   Non-intractable vomiting with nausea, unspecified vomiting type   Diarrhea, unspecified type   Colitis   Cyst of ovary, unspecified laterality       ED Disposition  ED Disposition     ED Disposition Condition Comment    Discharge Stable           [facility]  [phone]  [address] [place] [address]  Schedule an appointment as soon as possible for a visit       Rea Cordoba, APRN  [address] [address]  [phone]    Schedule an appointment as soon as possible for a visit            Medication List      New Prescriptions    amoxicillin-clavulanate 875-125 MG per tablet  Commonly known as: AUGMENTIN  Take 1 tablet by mouth 2 (Two) Times a Day.           Where to Get Your Medications      These medications were sent to [id number] - [place], [place] - [address] [facility] - [phone]  - [phone] FX  [address] [place] [address]    Phone: [phone]   · amoxicillin-clavulanate 875-125 MG per tablet          Ana Laura Larson, APRN  04/19/21 3446

## 2021-04-19 NOTE — ED TRIAGE NOTES
Patient to er from PCP with c/o ABD cramping with nausea/ vomiting/ diarrhea for the last 12 days. Patient reported she had blood work and showed a WBC of 20,000 per patient report. Patient has mask on in triage along with staff.

## 2021-04-19 NOTE — ED NOTES
Patient has been having intermittently for the past 12 days N/V/D, patient states that in the last 24 hours she has vomited twice and patient has not had any diarrhea in the last 24 hours.  Patient had a bowel movement earlier today that was more formed but still soft.  Patient states that it is very erratic when she vomits or has diarrhea, she has not found a pattern.  Patient denies having a fever.  Patient has been to the PCP three times and she was sent in to the ED for her WBC being 25,000.       Alanna Buck, ZA  04/19/21 0638

## 2021-04-19 NOTE — TELEPHONE ENCOUNTER
Contact patient.     She scheduled appointment to follow up today from Friday's visit.     Labs from Friday demonstrate very high WBC count, indicative of some type of significant systemic infection.     If she is feeling worse than Friday, I would recommend that she forego the office visit and go to ER instead for evaluation.

## 2021-04-20 NOTE — ED NOTES
Patient tolerated the ice water and is not nauseous at this time.  Will continue to monitor.     Alanna Buck RN  04/19/21 2017       Alanna Buck RN  04/19/21 2020

## 2021-04-20 NOTE — DISCHARGE INSTRUCTIONS
Please follow up with the primary care provider, call for appointment  Please follow up with gastroenterology for further evaluation, call for appointment  Return the ED for new or worsening symptoms  Complete entire course of antibiotics

## 2021-06-18 ENCOUNTER — TELEPHONE (OUTPATIENT)
Dept: INTERNAL MEDICINE | Age: 41
End: 2021-06-18

## 2021-06-18 NOTE — TELEPHONE ENCOUNTER
AMBERTVM advising pt stool sample orders are overdue. Instructed pt to call office and let us know status so we may keep or d/c orders. MM

## 2022-01-06 ENCOUNTER — TELEPHONE (OUTPATIENT)
Dept: INTERNAL MEDICINE | Age: 42
End: 2022-01-06

## 2022-01-06 NOTE — TELEPHONE ENCOUNTER
LVM PATIENT NEEDS TO RESCHEDULE OR CANCEL APPOINTMENT WITH [facility] DUE TO PROVIDER LEAVING THE OFFICE.  OK FOR HUB TO SCHEDULE

## 2022-01-13 ENCOUNTER — TELEPHONE (OUTPATIENT)
Dept: INTERNAL MEDICINE | Age: 42
End: 2022-01-13

## 2022-01-13 NOTE — TELEPHONE ENCOUNTER
LVM FOR PATIENT TO RESCHEDULE APPT WITH MOHINDER MILLER DUE TO PROVIDER LEAVING OFFICE. OK FOR HUB TO SCHEDULE

## 2022-09-02 ENCOUNTER — HOSPITAL ENCOUNTER (EMERGENCY)
Facility: HOSPITAL | Age: 42
Discharge: HOME | End: 2022-09-02
Attending: EMERGENCY MEDICINE | Admitting: EMERGENCY MEDICINE

## 2022-09-02 VITALS
RESPIRATION RATE: 16 BRPM | WEIGHT: 167.77 LBS | TEMPERATURE: 98.2 F | SYSTOLIC BLOOD PRESSURE: 129 MMHG | HEART RATE: 104 BPM | BODY MASS INDEX: 30.87 KG/M2 | DIASTOLIC BLOOD PRESSURE: 87 MMHG | HEIGHT: 62 IN | OXYGEN SATURATION: 95 %

## 2022-09-02 DIAGNOSIS — K08.89 PAIN, DENTAL: Primary | ICD-10-CM

## 2022-09-02 PROCEDURE — 25010000002 KETOROLAC TROMETHAMINE PER 15 MG: Performed by: NURSE PRACTITIONER

## 2022-09-02 PROCEDURE — 99283 EMERGENCY DEPT VISIT LOW MDM: CPT

## 2022-09-02 PROCEDURE — 96372 THER/PROPH/DIAG INJ SC/IM: CPT

## 2022-09-02 RX ORDER — HYDROCODONE BITARTRATE AND ACETAMINOPHEN 5; 325 MG/1; MG/1
1 TABLET ORAL ONCE AS NEEDED
Status: COMPLETED | OUTPATIENT
Start: 2022-09-02 | End: 2022-09-02

## 2022-09-02 RX ORDER — KETOROLAC TROMETHAMINE 30 MG/ML
30 INJECTION, SOLUTION INTRAMUSCULAR; INTRAVENOUS ONCE
Status: COMPLETED | OUTPATIENT
Start: 2022-09-02 | End: 2022-09-02

## 2022-09-02 RX ORDER — NAPROXEN 500 MG/1
500 TABLET ORAL 2 TIMES DAILY PRN
Qty: 10 TABLET | Refills: 0 | Status: SHIPPED | OUTPATIENT
Start: 2022-09-02

## 2022-09-02 RX ADMIN — HYDROCODONE BITARTRATE AND ACETAMINOPHEN 1 TABLET: 5; 325 TABLET ORAL at 20:37

## 2022-09-02 RX ADMIN — KETOROLAC TROMETHAMINE 30 MG: 30 INJECTION, SOLUTION INTRAMUSCULAR at 19:58

## 2022-09-02 RX ADMIN — ALUMINA, MAGNESIA, AND SIMETHICONE: 2400; 2400; 240 SUSPENSION ORAL at 19:58

## 2022-09-02 NOTE — ED PROVIDER NOTES
Subjective    Chief Complaint   Patient presents with   • Dental Pain     Provider, No Known   No LMP recorded (lmp unknown).    Patient is a 42-year-old female presents emergency department with complaint of dental pain, she reports that she has been having dental pain secondary to this tooth for 18 to 20 years, she went to her dentist, they attempted a filling and a root canal, but they could not continue to give further lidocaine, she reports since then she said worsening pain.  She is on amoxicillin currently has been on this for 3 days.  She denies fever chills.  She does endorse left-sided facial swelling.  No visual changes, or pain with eye movement.  No difficulty swallowing or maintaining oral secretions.          Review of Systems   Constitutional: Negative for chills and fever.   HENT: Positive for dental problem and facial swelling.    Respiratory: Negative for shortness of breath and stridor.    Cardiovascular: Negative for chest pain.   Gastrointestinal: Negative for diarrhea, nausea and vomiting.   Musculoskeletal: Negative for back pain and neck pain.   Skin: Negative for color change and rash.   Neurological: Negative for headaches.       Past Medical History:   Diagnosis Date   • Allergic    • Anxiety    • Depression    • Hyperlipidemia    • Migraine with aura    • Sciatica        Allergies   Allergen Reactions   • Dilaudid [Hydromorphone] Rash       Past Surgical History:   Procedure Laterality Date   • FACIAL RECONSTRUCTION SURGERY      due to infection after root canal       Family History   Problem Relation Age of Onset   • Breast cancer Mother    • Depression Mother    • Miscarriages / Stillbirths Sister    • Alcohol abuse Brother    • Drug abuse Brother    • Breast cancer Maternal Aunt    • Breast cancer Maternal Grandmother    • Stroke Maternal Grandmother    • Alcohol abuse Maternal Grandmother    • Alcohol abuse Maternal Grandfather    • No Known Problems Father        Social History  "    Socioeconomic History   • Marital status: Significant Other   Tobacco Use   • Smoking status: Former Smoker     Quit date: 1/9/2019     Years since quitting: 3.6   • Smokeless tobacco: Never Used   Vaping Use   • Vaping Use: Never used   Substance and Sexual Activity   • Alcohol use: Yes     Comment: intermittently   • Drug use: No   • Sexual activity: Yes     Partners: Female           Objective   Physical Exam  Vitals and nursing note reviewed.   HENT:      Head:      Jaw: No trismus.        Comments: No fluctuance or abscess noted. Floor of the mouth is normal. No evidence forLudwigs angina.  Mild facial swelling and tenderness noted above tolerating oral secretions normally without pooling.     Nose: Nose normal.      Mouth/Throat:      Mouth: Mucous membranes are moist.      Pharynx: Oropharynx is clear. Uvula midline.     Eyes:      Extraocular Movements: Extraocular movements intact.      Conjunctiva/sclera: Conjunctivae normal.      Pupils: Pupils are equal, round, and reactive to light.   Cardiovascular:      Rate and Rhythm: Normal rate and regular rhythm.      Heart sounds: Normal heart sounds. No murmur heard.    No friction rub. No gallop.   Abdominal:      General: Bowel sounds are normal.      Palpations: Abdomen is soft.      Tenderness: There is no abdominal tenderness. There is no guarding or rebound.   Musculoskeletal:         General: Normal range of motion.      Cervical back: Normal range of motion and neck supple.   Lymphadenopathy:      Cervical: No cervical adenopathy.   Skin:     General: Skin is warm and dry.      Capillary Refill: Capillary refill takes less than 2 seconds.   Neurological:      Mental Status: She is alert and oriented to person, place, and time.   Psychiatric:         Mood and Affect: Mood normal.         Behavior: Behavior normal.         Procedures           ED Course           /87   Pulse 104   Temp 98.2 °F (36.8 °C) (Oral)   Resp 16   Ht 157.5 cm (62\")  "  Wt 76.1 kg (167 lb 12.3 oz)   LMP  (LMP Unknown)   SpO2 95%   BMI 30.69 kg/m²   Labs Reviewed - No data to display  Medications   HYDROcodone-acetaminophen (NORCO) 5-325 MG per tablet 1 tablet (has no administration in time range)   dental ball oral suspension with diphenhydrAMINE ( Swish & Spit Given 9/2/22 1958)   ketorolac (TORADOL) injection 30 mg (30 mg Intramuscular Given 9/2/22 1958)     No radiology results for the last day                                  MDM  Lab work and imaging not indicated at this time.    Differentials: Ciro's angina, abscess, cellulitis    Patient was brought back to the emergency department room for evaluation and placed on appropriate monitoring.  She underwent above exam and work-up.  She is currently on amoxicillin per her dentist, she is on day 3.  We discussed changing antibiotic, however ensure decision-making, the patient to continue her current course, she will continue with her dental bosselation, be prescribed NSAID, she is given a pain pill here in the ED, and will continue follow-up with her dentist.  I spoke with the patient at the bedside regarding their plan of care, discharge instruction, home care, prescriptions, indications to return to the emergency department, and importance follow-up.  We discussed test results at the bedside, including incidental abnormal labs, radiological findings, understands need for follow-up with primary care or specialist if indicated. Pt is aware that discharge does not mean that nothing is wrong but it indicates no emergency is present and they must continue care with follow-up as given below or physician of their choice      Tests considered but not performed: Facial CT, however at this time I see no indication for CT to be performed.    Note Disclaimer: At [facility], we believe that sharing information builds trust and better relationships. You are receiving this note because you recently visited [facility]. It is  possible you will see health information before a provider has talked with you about it. This kind of information can be easy to misunderstand. To help you fully understand what it means for your health, we urge you to discuss this note with your provider.    Note dicatated utilizing Dragon Dictation.  Final diagnoses:   Pain, dental       ED Disposition  ED Disposition     ED Disposition   Discharge    Condition   Stable    Comment   --             [facility] EMERGENCY DEPARTMENT  [address] [address] [phone]    As needed, If symptoms worsen         Medication List      New Prescriptions    naproxen 500 MG EC tablet  Commonly known as: EC NAPROSYN  Take 1 tablet by mouth 2 (Two) Times a Day As Needed (Pain).           Where to Get Your Medications      These medications were sent to [facility] [address] [place] [address], [place] - [address] - [phone]  - [phone] FX  [address]  [place] [address]    Phone: [phone]   · naproxen 500 MG EC tablet          Ana Laura Larson, APRN  09/02/22 2032

## 2022-09-03 NOTE — DISCHARGE INSTRUCTIONS
Please follow-up with your primary care provider, if you not have a primary care provider please utilize patient connection above to establish care  Return to the ED for new or worsening symptoms  Follow up with Specialist if indicated above-call for an appointment-continue follow-up with your dentist  Continue antibiotic treatment course  Do not take additional NSAIDs such as Advil, Motrin, ibuprofen, Aleve while taking prescribed Naproxen